# Patient Record
Sex: FEMALE | HISPANIC OR LATINO | Employment: UNEMPLOYED | URBAN - METROPOLITAN AREA
[De-identification: names, ages, dates, MRNs, and addresses within clinical notes are randomized per-mention and may not be internally consistent; named-entity substitution may affect disease eponyms.]

---

## 2017-08-22 ENCOUNTER — TRANSFERRED RECORDS (OUTPATIENT)
Dept: HEALTH INFORMATION MANAGEMENT | Facility: CLINIC | Age: 57
End: 2017-08-22

## 2017-09-21 ENCOUNTER — APPOINTMENT (OUTPATIENT)
Dept: ULTRASOUND IMAGING | Facility: CLINIC | Age: 57
End: 2017-09-21
Attending: EMERGENCY MEDICINE
Payer: COMMERCIAL

## 2017-09-21 ENCOUNTER — HOSPITAL ENCOUNTER (EMERGENCY)
Facility: CLINIC | Age: 57
Discharge: HOME | End: 2017-09-21
Attending: EMERGENCY MEDICINE | Admitting: EMERGENCY MEDICINE
Payer: COMMERCIAL

## 2017-09-21 VITALS
DIASTOLIC BLOOD PRESSURE: 100 MMHG | SYSTOLIC BLOOD PRESSURE: 153 MMHG | TEMPERATURE: 98.4 F | OXYGEN SATURATION: 99 % | RESPIRATION RATE: 18 BRPM

## 2017-09-21 DIAGNOSIS — N89.7 HEMATOCOLPOS: ICD-10-CM

## 2017-09-21 DIAGNOSIS — R31.29 MICROSCOPIC HEMATURIA: ICD-10-CM

## 2017-09-21 DIAGNOSIS — R10.2 PELVIC PAIN IN FEMALE: ICD-10-CM

## 2017-09-21 LAB
ALBUMIN UR-MCNC: 100 MG/DL
AMORPH CRY #/AREA URNS HPF: ABNORMAL /HPF
ANION GAP SERPL CALCULATED.3IONS-SCNC: 5 MMOL/L (ref 3–14)
APPEARANCE UR: ABNORMAL
BACTERIA #/AREA URNS HPF: ABNORMAL /HPF
BASOPHILS # BLD AUTO: 0 10E9/L (ref 0–0.2)
BASOPHILS NFR BLD AUTO: 0.4 %
BILIRUB UR QL STRIP: NEGATIVE
BUN SERPL-MCNC: 17 MG/DL (ref 7–30)
CALCIUM SERPL-MCNC: 9.1 MG/DL (ref 8.5–10.1)
CHLORIDE SERPL-SCNC: 105 MMOL/L (ref 94–109)
CO2 SERPL-SCNC: 28 MMOL/L (ref 20–32)
COLOR UR AUTO: YELLOW
CREAT SERPL-MCNC: 0.66 MG/DL (ref 0.52–1.04)
DIFFERENTIAL METHOD BLD: NORMAL
EOSINOPHIL # BLD AUTO: 0.2 10E9/L (ref 0–0.7)
EOSINOPHIL NFR BLD AUTO: 2.1 %
ERYTHROCYTE [DISTWIDTH] IN BLOOD BY AUTOMATED COUNT: 13 % (ref 10–15)
GFR SERPL CREATININE-BSD FRML MDRD: >90 ML/MIN/1.7M2
GLUCOSE SERPL-MCNC: 93 MG/DL (ref 70–99)
GLUCOSE UR STRIP-MCNC: NEGATIVE MG/DL
HCT VFR BLD AUTO: 38.8 % (ref 35–47)
HGB BLD-MCNC: 12.4 G/DL (ref 11.7–15.7)
HGB UR QL STRIP: ABNORMAL
IMM GRANULOCYTES # BLD: 0.1 10E9/L (ref 0–0.4)
IMM GRANULOCYTES NFR BLD: 0.6 %
KETONES UR STRIP-MCNC: NEGATIVE MG/DL
LEUKOCYTE ESTERASE UR QL STRIP: NEGATIVE
LYMPHOCYTES # BLD AUTO: 2 10E9/L (ref 0.8–5.3)
LYMPHOCYTES NFR BLD AUTO: 23.1 %
MCH RBC QN AUTO: 30.2 PG (ref 26.5–33)
MCHC RBC AUTO-ENTMCNC: 32 G/DL (ref 31.5–36.5)
MCV RBC AUTO: 95 FL (ref 78–100)
MONOCYTES # BLD AUTO: 1 10E9/L (ref 0–1.3)
MONOCYTES NFR BLD AUTO: 11.3 %
MUCOUS THREADS #/AREA URNS LPF: PRESENT /LPF
NEUTROPHILS # BLD AUTO: 5.3 10E9/L (ref 1.6–8.3)
NEUTROPHILS NFR BLD AUTO: 62.5 %
NITRATE UR QL: NEGATIVE
NRBC # BLD AUTO: 0 10*3/UL
NRBC BLD AUTO-RTO: 0 /100
PH UR STRIP: 5 PH (ref 5–7)
PLATELET # BLD AUTO: 291 10E9/L (ref 150–450)
POTASSIUM SERPL-SCNC: 4.1 MMOL/L (ref 3.4–5.3)
RBC # BLD AUTO: 4.1 10E12/L (ref 3.8–5.2)
RBC #/AREA URNS AUTO: 71 /HPF (ref 0–2)
SODIUM SERPL-SCNC: 138 MMOL/L (ref 133–144)
SOURCE: ABNORMAL
SP GR UR STRIP: 1.01 (ref 1–1.03)
SPECIMEN SOURCE: NORMAL
SQUAMOUS #/AREA URNS AUTO: 1 /HPF (ref 0–1)
UROBILINOGEN UR STRIP-MCNC: 0 MG/DL (ref 0–2)
WBC # BLD AUTO: 8.5 10E9/L (ref 4–11)
WBC #/AREA URNS AUTO: 9 /HPF (ref 0–2)
WET PREP SPEC: NORMAL

## 2017-09-21 PROCEDURE — 80048 BASIC METABOLIC PNL TOTAL CA: CPT | Performed by: EMERGENCY MEDICINE

## 2017-09-21 PROCEDURE — 76830 TRANSVAGINAL US NON-OB: CPT

## 2017-09-21 PROCEDURE — 87491 CHLMYD TRACH DNA AMP PROBE: CPT | Performed by: EMERGENCY MEDICINE

## 2017-09-21 PROCEDURE — 87210 SMEAR WET MOUNT SALINE/INK: CPT | Performed by: EMERGENCY MEDICINE

## 2017-09-21 PROCEDURE — 81001 URINALYSIS AUTO W/SCOPE: CPT | Performed by: NURSE PRACTITIONER

## 2017-09-21 PROCEDURE — 85025 COMPLETE CBC W/AUTO DIFF WBC: CPT | Performed by: EMERGENCY MEDICINE

## 2017-09-21 PROCEDURE — 25000128 H RX IP 250 OP 636: Performed by: EMERGENCY MEDICINE

## 2017-09-21 PROCEDURE — 99284 EMERGENCY DEPT VISIT MOD MDM: CPT | Mod: 25

## 2017-09-21 PROCEDURE — 96374 THER/PROPH/DIAG INJ IV PUSH: CPT

## 2017-09-21 PROCEDURE — 87591 N.GONORRHOEAE DNA AMP PROB: CPT | Performed by: EMERGENCY MEDICINE

## 2017-09-21 RX ORDER — KETOROLAC TROMETHAMINE 30 MG/ML
30 INJECTION, SOLUTION INTRAMUSCULAR; INTRAVENOUS ONCE
Status: COMPLETED | OUTPATIENT
Start: 2017-09-21 | End: 2017-09-21

## 2017-09-21 RX ORDER — IBUPROFEN 200 MG
600 TABLET ORAL EVERY 6 HOURS PRN
Qty: 60 TABLET | Refills: 0 | Status: SHIPPED | OUTPATIENT
Start: 2017-09-21 | End: 2019-05-13

## 2017-09-21 RX ADMIN — KETOROLAC TROMETHAMINE 30 MG: 30 INJECTION, SOLUTION INTRAMUSCULAR at 18:03

## 2017-09-21 ASSESSMENT — ENCOUNTER SYMPTOMS
DIARRHEA: 1
HEMATURIA: 1
NAUSEA: 0
VOMITING: 0
DYSURIA: 1
FEVER: 0
BACK PAIN: 0

## 2017-09-21 NOTE — ED AVS SNAPSHOT
[facility] Emergency Department    [address] [facility] [address]    Phone:  [phone]    Fax:  [phone]                                       Lo Stewart   MRN: [id number]    Department:  [facility] Emergency Department   Date of Visit:  9/21/2017           Patient Information     Date Of Birth          1960        Your diagnoses for this visit were:     Microscopic hematuria     Hematocolpos     Pelvic pain in female        You were seen by Ricky Reyna DO.      Follow-up Information     Follow up with Jose Juan Liu MD.    Specialty:  OB/Gyn    Why:  Call the above number and ask to be seen at the [facility] in the next 1-2 weeks.    Contact information:    [facility]  [address]  [facility] [address] [phone]          Follow up with Shobha Malcolm Call in 2 days.    Why:  As needed    Contact information:    [address] [facility]  Attn: [facility]  [facility] [address]          Follow up with [facility] Emergency Department.    Specialty:  EMERGENCY MEDICINE    Why:  If symptoms worsen    Contact information:    [address] [facility] [address]  [phone]        Discharge Instructions         Pelvic Pain, Uncertain Cause    Pelvic pain is pain felt in the lowest part of the belly (abdomen) and between the hipbones. The pain may be acute. This means it occurred suddenly and recently. Or the pain may be chronic. This means it has occurred for 6 months or longer.  There are many possible causes of pelvic pain. The pain may be due to a problem in the female reproductive system (pictured here). Or, it may be due to a problem in the digestive, urinary, or musculoskeletal systems.  Based on your visit today, the exact cause of your pelvic pain is not certain. Your condition does not appear to be serious at this time. But it is important for you to keep watching for any new  symptoms or worsening of your condition.  General care  Your healthcare provider may advise a number of ways to help manage your pain. These can include:    Taking over-the-counter pain medicine. Stronger pain medicine may also be prescribed, if needed.    Applying heat to the pelvic area. Use a heating pad or a hot pack. Taking a hot bath may also help.    Getting plenty of rest.    Making certain lifestyle changes. These can include practicing good posture and getting regular exercise. (Studies have shown that these changes help reduce pelvic pain in some women.)    Seeing a physical therapist or pain specialist. These healthcare providers can discuss other ways to manage pain with you.  Follow-up care  Follow up with your healthcare provider, or as advised.   When to seek medical advice  Call your healthcare provider right away if any of the following occur:    Fever of 100.4 F or higher, or as directed by your healthcare provider    Pain worsens or you have sudden, severe pain or new pain    Nausea, vomiting, sweating, or restlessness    Dizziness or fainting    Unusual vaginal discharge    Abnormal vaginal bleeding (especially bleeding after menopause)  Date Last Reviewed: 6/11/2015 2000-2017 [employer]. [address] [place] [address]. All rights reserved. This information is not intended as a substitute for professional medical care. Always follow your healthcare professional's instructions.        Blood in the Urine    Blood in the urine (hematuria) has many possible causes. If it occurs after an injury (such as a car accident or fall), it is most often a sign of bruising to the kidney or bladder. Common causes of blood in the urine include urinary tract infections, kidney stones, inflammation, tumors, or certain other diseases of the kidney or bladder. Menstruation can cause blood to appear in the urine sample, although it is not coming from the urinary tract.  If only a trace  amount of blood is present, it will show up on the urine test, even though the urine may be yellow and not pink or red. This may occur with any of the above conditions, as well as heavy exercise or high fever. In this case, your doctor may want to repeat the urine test on another day. This will show if the blood is still present. If it is, then other tests can be done to find out the cause.  Home care  Follow these home care guidelines:    If your urine does not appear bloody (pink, brown or red) then you do not need to restrict your activity in any way.    If you can see blood in your urine, rest and avoid heavy exertion until your next exam. Do not use aspirin, blood thinners, or anti-platelet or anti-inflammatory medicines. These include ibuprofen and naproxen. These thin the blood and may increase bleeding.  Follow-up care  Follow up with your healthcare provider, or as advised. If you were injured and had blood in your urine, you should have a repeat urine test in 1 to 2 days. Contact your doctor for this test.  A radiologist will review any X-rays that were taken. You will be told of any new findings that may affect your care.  When to seek medical advice  Call your healthcare provider right away if any of these occur:    Bright red blood or blood clots in the urine (if you did not have this before)    Weakness, dizziness or fainting    New groin, abdominal, or back pain    Fever of 100.4 F (38 C) or higher, or as directed by your healthcare provider    Repeated vomiting    Bleeding from the nose or gums or easy bruising  Date Last Reviewed: 9/1/2016 2000-2017 [employer] [address] [place] [address], [place] [address]. All rights reserved. This information is not intended as a substitute for professional medical care. Always follow your healthcare professional's instructions.          24 Hour Appointment Hotline       To make an appointment at any [facility], call [phone]  [phone]). If you don't have a family doctor or clinic, we will help you find one. [place] clinics are conveniently located to serve the needs of you and your family.             Review of your medicines      START taking        Dose / Directions Last dose taken    ibuprofen 200 MG tablet   Commonly known as:  ADVIL/MOTRIN   Dose:  600 mg   Quantity:  60 tablet        Take 3 tablets (600 mg) by mouth every 6 hours as needed for mild pain or fever   Refills:  0                Prescriptions were sent or printed at these locations (1 Prescription)                   Other Prescriptions                Printed at Department/Unit printer (1 of 1)         ibuprofen (ADVIL/MOTRIN) 200 MG tablet                Procedures and tests performed during your visit     Basic metabolic panel    CBC with platelets differential    Chlamydia trachomatis PCR    Neisseria gonorrhoea PCR    Prep for procedure - pelvic exam    UA with Microscopic    US Pelvic Complete w Transvaginal    Wet prep      Orders Needing Specimen Collection     None      Pending Results     Date and Time Order Name Status Description    9/21/2017 1742 Neisseria gonorrhoea PCR In process     9/21/2017 1742 Chlamydia trachomatis PCR In process     9/21/2017 1742 US Pelvic Complete w Transvaginal Preliminary             Pending Culture Results     Date and Time Order Name Status Description    9/21/2017 1742 Neisseria gonorrhoea PCR In process     9/21/2017 1742 Chlamydia trachomatis PCR In process             Pending Results Instructions     If you had any lab results that were not finalized at the time of your Discharge, you can call the ED Lab Result RN at [phone]. You will be contacted by this team for any positive Lab results or changes in treatment. The nurses are available 7 days a week from 10A to 6:30P.  You can leave a message 24 hours per day and they will return your call.        Test Results From Your Hospital Stay        9/21/2017  4:39 PM       Component Results     Component Value Ref Range & Units Status    Color Urine Yellow  Final    Appearance Urine Cloudy  Final    Glucose Urine Negative NEG^Negative mg/dL Final    Bilirubin Urine Negative NEG^Negative Final    Ketones Urine Negative NEG^Negative mg/dL Final    Specific Gravity Urine 1.014 1.003 - 1.035 Final    Blood Urine Large (A) NEG^Negative Final    pH Urine 5.0 5.0 - 7.0 pH Final    Protein Albumin Urine 100 (A) NEG^Negative mg/dL Final    Urobilinogen mg/dL 0.0 0.0 - 2.0 mg/dL Final    Nitrite Urine Negative NEG^Negative Final    Leukocyte Esterase Urine Negative NEG^Negative Final    Source Midstream Urine  Final    WBC Urine 9 (H) 0 - 2 /HPF Final    RBC Urine 71 (H) 0 - 2 /HPF Final    Bacteria Urine Few (A) NEG^Negative /HPF Final    Squamous Epithelial /HPF Urine 1 0 - 1 /HPF Final    Mucous Urine Present (A) NEG^Negative /LPF Final    Amorphous Crystals Few (A) NEG^Negative /HPF Final         9/21/2017  6:25 PM      Component Results     Component Value Ref Range & Units Status    WBC 8.5 4.0 - 11.0 10e9/L Final    RBC Count 4.10 3.8 - 5.2 10e12/L Final    Hemoglobin 12.4 11.7 - 15.7 g/dL Final    Hematocrit 38.8 35.0 - 47.0 % Final    MCV 95 78 - 100 fl Final    MCH 30.2 26.5 - 33.0 pg Final    MCHC 32.0 31.5 - 36.5 g/dL Final    RDW 13.0 10.0 - 15.0 % Final    Platelet Count 291 150 - 450 10e9/L Final    Diff Method Automated Method  Final    % Neutrophils 62.5 % Final    % Lymphocytes 23.1 % Final    % Monocytes 11.3 % Final    % Eosinophils 2.1 % Final    % Basophils 0.4 % Final    % Immature Granulocytes 0.6 % Final    Nucleated RBCs 0 0 /100 Final    Absolute Neutrophil 5.3 1.6 - 8.3 10e9/L Final    Absolute Lymphocytes 2.0 0.8 - 5.3 10e9/L Final    Absolute Monocytes 1.0 0.0 - 1.3 10e9/L Final    Absolute Eosinophils 0.2 0.0 - 0.7 10e9/L Final    Absolute Basophils 0.0 0.0 - 0.2 10e9/L Final    Abs Immature Granulocytes 0.1 0 - 0.4 10e9/L Final    Absolute Nucleated RBC 0.0   Final         9/21/2017  6:48 PM      Component Results     Component Value Ref Range & Units Status    Sodium 138 133 - 144 mmol/L Final    Potassium 4.1 3.4 - 5.3 mmol/L Final    Chloride 105 94 - 109 mmol/L Final    Carbon Dioxide 28 20 - 32 mmol/L Final    Anion Gap 5 3 - 14 mmol/L Final    Glucose 93 70 - 99 mg/dL Final    Urea Nitrogen 17 7 - 30 mg/dL Final    Creatinine 0.66 0.52 - 1.04 mg/dL Final    GFR Estimate >90 >60 mL/min/1.7m2 Final    Non  GFR Calc    GFR Estimate If Black >90 >60 mL/min/1.7m2 Final    African American GFR Calc    Calcium 9.1 8.5 - 10.1 mg/dL Final         9/21/2017  7:53 PM      Narrative     PELVIC ULTRASOUND  WITH ENDOVAGINAL TRANSDUCER  9/21/2017 7:40 PM     HISTORY: Pelvic pain    TECHNIQUE: Endovaginal sonography was added to the transabdominal exam  to better evaluate the uterus and ovaries.      COMPARISON: CT abdomen and pelvis dated 12/26/2016.    FINDINGS: The uterus is normal in size, shape and echotexture  measuring 6.2 x 2.9 x 4.1 cm. Endometrium contains a mildly complex  fluid measuring up to 1.1 cm in thickness. This likely represents a  small amount of blood and could be related to menstrual cycle.  Recommend clinical correlation.    The ovaries are not seen and cannot be evaluated. No abnormal free  fluid collections are identified.        Impression     IMPRESSION:  1. Mildly complex fluid (measuring up to 1.1 cm in thickness) is seen  in the endometrial canal of uncertain etiology, but could represent an  hematometrocolpos or hydrometrocolpos. I cannot exclude an anatomic  mechanical obstruction of the distal endometrial canal. Recommend  clinical correlation.  2. Ovaries are not seen and cannot be evaluated. No obvious adnexal  mass is identified.  3. No other significant abnormalities are identified.         9/21/2017  6:48 PM      Component Results     Component    Specimen Description    Vagina    Wet Prep    Few  PMNs seen      Wet Prep    No  clue cells seen    Wet Prep    No yeast seen    Wet Prep    No Trichomonas seen         9/21/2017  6:28 PM         9/21/2017  6:28 PM                Clinical Quality Measure: Blood Pressure Screening     Your blood pressure was checked while you were in the emergency department today. The last reading we obtained was  BP: (!) 153/100 . Please read the guidelines below about what these numbers mean and what you should do about them.  If your systolic blood pressure (the top number) is less than 120 and your diastolic blood pressure (the bottom number) is less than 80, then your blood pressure is normal. There is nothing more that you need to do about it.  If your systolic blood pressure (the top number) is 120-139 or your diastolic blood pressure (the bottom number) is 80-89, your blood pressure may be higher than it should be. You should have your blood pressure rechecked within a year by a primary care provider.  If your systolic blood pressure (the top number) is 140 or greater or your diastolic blood pressure (the bottom number) is 90 or greater, you may have high blood pressure. High blood pressure is treatable, but if left untreated over time it can put you at risk for heart attack, stroke, or kidney failure. You should have your blood pressure rechecked by a primary care provider within the next 4 weeks.  If your provider in the emergency department today gave you specific instructions to follow-up with your doctor or provider even sooner than that, you should follow that instruction and not wait for up to 4 weeks for your follow-up visit.        Thank you for choosing [facility]       Thank you for choosing [facility] for your care. Our goal is always to provide you with excellent care. Hearing back from our patients is one way we can continue to improve our services. Please take a few minutes to complete the written survey that you may receive in the mail after you visit with us. Thank you!        Chelsey  "Information     [employer] lets you send messages to your doctor, view your test results, renew your prescriptions, schedule appointments and more. To sign up, go to [email or web address] . Click on \"Log in\" on the left side of the screen, which will take you to the Welcome page. Then click on \"Sign up Now\" on the right side of the page.     You will be asked to enter the access code listed below, as well as some personal information. Please follow the directions to create your username and password.     Your access code is: [email or web address]  Expires: 2017  8:22 PM     Your access code will  in 90 days. If you need help or a new code, please call your [facility] clinic or [phone].        Care EveryWhere ID     This is your Care EveryWhere ID. This could be used by other organizations to access your [facility] medical records  [id number]        Equal Access to Services     COURTNEY POMPA AH: Hadluz elena nguyễno Socary, waaxda lurosemarieadaha, qaybta kaalmada adenubia, alvina quiles . So [facility] [phone].    ATENCIÓN: Si habla español, tiene a montiel disposición servicios gratuitos de asistencia lingüística. Llame al [phone].    We comply with applicable federal civil rights laws and [place] laws. We do not discriminate on the basis of race, color, national origin, age, disability sex, sexual orientation or gender identity.            After Visit Summary       This is your record. Keep this with you and show to your community pharmacist(s) and doctor(s) at your next visit.                  "

## 2017-09-21 NOTE — ED PROVIDER NOTES
History     Chief Complaint:  Dysuria, Hematuria    The history is provided by the patient.      Lo Stewart is a 57 year old female who presents with diarrhea, dysuria, and hematuria. The patient is Divehi-speaking and provided the history using a  over the phone. The patient states she has had diarrhea for a few days, and then this morning she had a lot of blood in her urine and felt a burning sensation when she used the restroom. Those symptoms have stayed consistent throughout the day, prompting her to come to the ED for evaluation this evening. The patient states she only experiences pain when she uses the bathroom, but denies any fever, back pain, or other abdominal pain.    Allergies:  No known drug allergies    Medications:    The patient is not currently taking any prescribed medications.    Past Medical History:    Arthritis  Hypertension  Major depressive disorder    Past Surgical History:    Ovarian cyst removal  Cyst removal, leg  Lumpectomy breast  Rotator cuff repair, right shoulder  Orthopedic surgery, right knee    Family History:    Diabetes  Hypertension  Depression  Anxiety  Osteoporosis    Social History:  Smoking status: No  Alcohol use: Yes, rarely  Marital Status:  Single [1]    Review of Systems   Constitutional: Negative for fever.   Gastrointestinal: Positive for diarrhea. Negative for nausea and vomiting.   Genitourinary: Positive for dysuria and hematuria.   Musculoskeletal: Negative for back pain.   All other systems reviewed and are negative.      Physical Exam     Patient Vitals for the past 24 hrs:   BP Temp Heart Rate Resp SpO2   09/21/17 1810 (!) 153/100 - - - 99 %   09/21/17 1800 - - - - 99 %   09/21/17 1756 - - - - 100 %   09/21/17 1755 123/78 - - - 99 %   09/21/17 1745 - - - - 98 %   09/21/17 1741 - - - - 99 %   09/21/17 1740 139/89 - - - 99 %   09/21/17 1737 - - - - 99 %   09/21/17 1526 (!) 142/101 98.4  F (36.9  C) 81 18 98 %     Physical  Exam  Constitutional: Patient appears well-developed and well-nourished. Patient is in mild distress  HENT:    Head: No external signs of trauma noted.   Eyes: Conjunctivae are normal. Pupils are equal, round, and reactive to light.   Cardiovascular:    Normal rate, regular rhythm and normal heart sounds.     Exam reveals no friction rub.     No murmur heard.  Pulmonary/Chest:    Effort normal and breath sounds normal.    No respiratory distress.    There are no wheezes.    There are no rales.   Abdominal:    Soft.    Bowel sounds normal.    There is no distension.    There is no tenderness.    There is no rebound or guarding.    (Chaperoned):    Vulva: No external lesions, normal hair distribution, no adenopathy   Vagina: Moist, pink, no abnormal discharge, well rugated, no lesions   Cervix: smooth, pink, no visible lesions, no CMT   Uterus: Normal size, non-tender, mobile   Ovaries: No mass, non-tender, mobile   Cultures for GC, Chlamydia, and Trichomonas were taken  Musculoskeletal:    Normal range of motion.    Normal Tone  Neurological: Patient is alert and oriented to person, place, and time.   Skin: Skin is warm and dry. Patient is not diaphoretic.    Emergency Department Course   Imaging:  Radiographic findings were communicated with the patient who voiced understanding of the findings.    US Pelvic Complete w Transvaginal:  IMPRESSION:  1. Mildly complex fluid (measuring up to 1.1 cm in thickness) is seen  in the endometrial canal of uncertain etiology, but could represent an  hematometrocolpos or hydrometrocolpos. I cannot exclude an anatomic  mechanical obstruction of the distal endometrial canal. Recommend  clinical correlation.  2. Ovaries are not seen and cannot be evaluated. No obvious adnexal  mass is identified.  3. No other significant abnormalities are identified.  As read by Radiology.    Laboratory:  UA: Blood large, Protein albumin 100, WBC 9 (H), RBC 71 (H), Bacteria few, Mucous present,  "Amorphous crystals few, o/w negative  CBC: WNL (WBC 8.5, HGB 12.4, )   BMP: WNL (Creatinine 0.66)  Chlamydia trachomatis: Pending  Neisseria gonorrhoea: Pending  Wet prep: Normal    Interventions:  1803: 30 mg Toradol     Emergency Department Course:  Past medical records, nursing notes, and vitals reviewed.  1706: I performed an exam of the patient and obtained history, as documented above.  UA collected, results above.    1800: I rechecked the patient. Explained findings to the patient.    2015: D/W Dr. Liu [facility]. Discussed patient's presentation, exam, and US findings. Patient can follow up in office in 1-2 weeks.    Impression & Plan      Medical Decision Making:  Lo Stewart is a 57 year old female in Room 30. The patient presents to the ER for evaluation of hematuria and \"vaginal pain.\" Using the , it seemed like she was having some pelvic-area pain, but no vaginal bleeding. The patient had no abdominal or back pain at all. Her urinalysis was negative for infection, and the patient has not been febrile, making a UTI much less likely. The patient did have some discomfort on the bilateral adnexal area, but no cervical motion tenderness. There was no vaginal discharge noted either. The patient's ultrasound shows findings possibly c/w hematometrocolpos or hydrometrocolpos. I discussed this with OB, who would like to see the patient in the clinic in 1-2 weeks. At this time, I believe the patient is stable for discharge and can follow up in the outpatient setting. Anticipatory guidance given prior to discharge.     Diagnosis:    ICD-10-CM    1. Microscopic hematuria R31.29    2. Hematocolpos N94.89    3. Pelvic pain in female R10.2        Disposition: Discharged to home    Discharge Medications:  New Prescriptions    IBUPROFEN (ADVIL/MOTRIN) 200 MG TABLET    Take 3 tablets (600 mg) by mouth every 6 hours as needed for mild pain or fever     Susan Coe  9/21/2017 "   [facility] EMERGENCY DEPARTMENT    I, Susan Coe, am serving as a scribe at 5:06 PM on 9/21/2017 to document services personally performed by Ricky Reyna DO based on my observations and the provider's statements to me.        Ricky Reyna DO  09/21/17 2024

## 2017-09-21 NOTE — ED AVS SNAPSHOT
[facility] Emergency Department    [address]    [facility] [address]    Phone:  [phone]    Fax:  [phone]                                       Lo Stewart   MRN: [id number]    Department:  [facility] Emergency Department   Date of Visit:  9/21/2017           After Visit Summary Signature Page     I have received my discharge instructions, and my questions have been answered. I have discussed any challenges I see with this plan with the nurse or doctor.    ..........................................................................................................................................  Patient/Patient Representative Signature      ..........................................................................................................................................  Patient Representative Print Name and Relationship to Patient    ..................................................               ................................................  Date                                            Time    ..........................................................................................................................................  Reviewed by Signature/Title    ...................................................              ..............................................  Date                                                            Time

## 2017-09-22 LAB
C TRACH DNA SPEC QL NAA+PROBE: NEGATIVE
N GONORRHOEA DNA SPEC QL NAA+PROBE: NEGATIVE
SPECIMEN SOURCE: NORMAL
SPECIMEN SOURCE: NORMAL

## 2017-09-22 NOTE — DISCHARGE INSTRUCTIONS
Pelvic Pain, Uncertain Cause    Pelvic pain is pain felt in the lowest part of the belly (abdomen) and between the hipbones. The pain may be acute. This means it occurred suddenly and recently. Or the pain may be chronic. This means it has occurred for 6 months or longer.  There are many possible causes of pelvic pain. The pain may be due to a problem in the female reproductive system (pictured here). Or, it may be due to a problem in the digestive, urinary, or musculoskeletal systems.  Based on your visit today, the exact cause of your pelvic pain is not certain. Your condition does not appear to be serious at this time. But it is important for you to keep watching for any new symptoms or worsening of your condition.  General care  Your healthcare provider may advise a number of ways to help manage your pain. These can include:    Taking over-the-counter pain medicine. Stronger pain medicine may also be prescribed, if needed.    Applying heat to the pelvic area. Use a heating pad or a hot pack. Taking a hot bath may also help.    Getting plenty of rest.    Making certain lifestyle changes. These can include practicing good posture and getting regular exercise. (Studies have shown that these changes help reduce pelvic pain in some women.)    Seeing a physical therapist or pain specialist. These healthcare providers can discuss other ways to manage pain with you.  Follow-up care  Follow up with your healthcare provider, or as advised.   When to seek medical advice  Call your healthcare provider right away if any of the following occur:    Fever of 100.4 F or higher, or as directed by your healthcare provider    Pain worsens or you have sudden, severe pain or new pain    Nausea, vomiting, sweating, or restlessness    Dizziness or fainting    Unusual vaginal discharge    Abnormal vaginal bleeding (especially bleeding after menopause)  Date Last Reviewed: 6/11/2015 2000-2017 [employer]. [address] [place] [address]  [place] [address]. All rights reserved. This information is not intended as a substitute for professional medical care. Always follow your healthcare professional's instructions.        Blood in the Urine    Blood in the urine (hematuria) has many possible causes. If it occurs after an injury (such as a car accident or fall), it is most often a sign of bruising to the kidney or bladder. Common causes of blood in the urine include urinary tract infections, kidney stones, inflammation, tumors, or certain other diseases of the kidney or bladder. Menstruation can cause blood to appear in the urine sample, although it is not coming from the urinary tract.  If only a trace amount of blood is present, it will show up on the urine test, even though the urine may be yellow and not pink or red. This may occur with any of the above conditions, as well as heavy exercise or high fever. In this case, your doctor may want to repeat the urine test on another day. This will show if the blood is still present. If it is, then other tests can be done to find out the cause.  Home care  Follow these home care guidelines:    If your urine does not appear bloody (pink, brown or red) then you do not need to restrict your activity in any way.    If you can see blood in your urine, rest and avoid heavy exertion until your next exam. Do not use aspirin, blood thinners, or anti-platelet or anti-inflammatory medicines. These include ibuprofen and naproxen. These thin the blood and may increase bleeding.  Follow-up care  Follow up with your healthcare provider, or as advised. If you were injured and had blood in your urine, you should have a repeat urine test in 1 to 2 days. Contact your doctor for this test.  A radiologist will review any X-rays that were taken. You will be told of any new findings that may affect your care.  When to seek medical advice  Call your healthcare provider right away if any of these occur:    Bright red  blood or blood clots in the urine (if you did not have this before)    Weakness, dizziness or fainting    New groin, abdominal, or back pain    Fever of 100.4 F (38 C) or higher, or as directed by your healthcare provider    Repeated vomiting    Bleeding from the nose or gums or easy bruising  Date Last Reviewed: 9/1/2016 2000-2017 [employer]. [address] [place] [address]. All rights reserved. This information is not intended as a substitute for professional medical care. Always follow your healthcare professional's instructions.

## 2018-05-15 ENCOUNTER — RECORDS - HEALTHEAST (OUTPATIENT)
Dept: ADMINISTRATIVE | Facility: OTHER | Age: 58
End: 2018-05-15

## 2018-05-16 ENCOUNTER — AMBULATORY - HEALTHEAST (OUTPATIENT)
Dept: VASCULAR SURGERY | Facility: CLINIC | Age: 58
End: 2018-05-16

## 2018-05-16 DIAGNOSIS — I83.93 VARICOSE VEINS OF BOTH LOWER EXTREMITIES: ICD-10-CM

## 2018-05-30 ENCOUNTER — OFFICE VISIT - HEALTHEAST (OUTPATIENT)
Dept: VASCULAR SURGERY | Facility: CLINIC | Age: 58
End: 2018-05-30

## 2018-05-30 ENCOUNTER — RECORDS - HEALTHEAST (OUTPATIENT)
Dept: VASCULAR ULTRASOUND | Facility: CLINIC | Age: 58
End: 2018-05-30

## 2018-05-30 DIAGNOSIS — M79.89 OTHER SPECIFIED SOFT TISSUE DISORDERS: ICD-10-CM

## 2018-05-30 DIAGNOSIS — I83.893 VARICOSE VEINS OF BILATERAL LOWER EXTREMITIES WITH OTHER COMPLICATIONS: ICD-10-CM

## 2018-05-30 DIAGNOSIS — M79.89 LEG SWELLING: ICD-10-CM

## 2018-05-30 DIAGNOSIS — I83.893 SYMPTOMATIC VARICOSE VEINS OF BOTH LOWER EXTREMITIES: ICD-10-CM

## 2018-05-30 ASSESSMENT — MIFFLIN-ST. JEOR: SCORE: 1487.15

## 2019-05-13 ENCOUNTER — APPOINTMENT (OUTPATIENT)
Dept: CT IMAGING | Facility: CLINIC | Age: 59
End: 2019-05-13
Attending: EMERGENCY MEDICINE
Payer: COMMERCIAL

## 2019-05-13 ENCOUNTER — APPOINTMENT (OUTPATIENT)
Dept: GENERAL RADIOLOGY | Facility: CLINIC | Age: 59
End: 2019-05-13
Attending: EMERGENCY MEDICINE
Payer: COMMERCIAL

## 2019-05-13 ENCOUNTER — APPOINTMENT (OUTPATIENT)
Dept: MRI IMAGING | Facility: CLINIC | Age: 59
End: 2019-05-13
Attending: EMERGENCY MEDICINE
Payer: COMMERCIAL

## 2019-05-13 ENCOUNTER — HOSPITAL ENCOUNTER (OUTPATIENT)
Facility: CLINIC | Age: 59
Setting detail: OBSERVATION
Discharge: HOME | End: 2019-05-16
Attending: EMERGENCY MEDICINE | Admitting: HOSPITALIST
Payer: COMMERCIAL

## 2019-05-13 DIAGNOSIS — S06.0X0A CONCUSSION WITHOUT LOSS OF CONSCIOUSNESS, INITIAL ENCOUNTER: Primary | ICD-10-CM

## 2019-05-13 DIAGNOSIS — M25.562 ACUTE PAIN OF LEFT KNEE: ICD-10-CM
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
## 2019-05-13 DIAGNOSIS — M79.662 PAIN OF LEFT LOWER LEG: ICD-10-CM

## 2019-05-13 LAB
ANION GAP SERPL CALCULATED.3IONS-SCNC: 3 MMOL/L (ref 3–14)
BASOPHILS # BLD AUTO: 0 10E9/L (ref 0–0.2)
BASOPHILS NFR BLD AUTO: 0.4 %
BUN SERPL-MCNC: 17 MG/DL (ref 7–30)
CALCIUM SERPL-MCNC: 8.4 MG/DL (ref 8.5–10.1)
CHLORIDE SERPL-SCNC: 110 MMOL/L (ref 94–109)
CO2 SERPL-SCNC: 29 MMOL/L (ref 20–32)
CREAT SERPL-MCNC: 0.58 MG/DL (ref 0.52–1.04)
DIFFERENTIAL METHOD BLD: NORMAL
EOSINOPHIL # BLD AUTO: 0.2 10E9/L (ref 0–0.7)
EOSINOPHIL NFR BLD AUTO: 2.3 %
ERYTHROCYTE [DISTWIDTH] IN BLOOD BY AUTOMATED COUNT: 13.1 % (ref 10–15)
GFR SERPL CREATININE-BSD FRML MDRD: >90 ML/MIN/{1.73_M2}
GLUCOSE BLDC GLUCOMTR-MCNC: 91 MG/DL (ref 70–99)
GLUCOSE SERPL-MCNC: 97 MG/DL (ref 70–99)
HCT VFR BLD AUTO: 38.8 % (ref 35–47)
HGB BLD-MCNC: 12.4 G/DL (ref 11.7–15.7)
IMM GRANULOCYTES # BLD: 0 10E9/L (ref 0–0.4)
IMM GRANULOCYTES NFR BLD: 0.4 %
LYMPHOCYTES # BLD AUTO: 2.4 10E9/L (ref 0.8–5.3)
LYMPHOCYTES NFR BLD AUTO: 28.9 %
MCH RBC QN AUTO: 30.2 PG (ref 26.5–33)
MCHC RBC AUTO-ENTMCNC: 32 G/DL (ref 31.5–36.5)
MCV RBC AUTO: 94 FL (ref 78–100)
MONOCYTES # BLD AUTO: 0.8 10E9/L (ref 0–1.3)
MONOCYTES NFR BLD AUTO: 9.4 %
NEUTROPHILS # BLD AUTO: 4.9 10E9/L (ref 1.6–8.3)
NEUTROPHILS NFR BLD AUTO: 58.6 %
NRBC # BLD AUTO: 0 10*3/UL
NRBC BLD AUTO-RTO: 0 /100
PLATELET # BLD AUTO: 264 10E9/L (ref 150–450)
POTASSIUM SERPL-SCNC: 4 MMOL/L (ref 3.4–5.3)
RBC # BLD AUTO: 4.11 10E12/L (ref 3.8–5.2)
SODIUM SERPL-SCNC: 142 MMOL/L (ref 133–144)
WBC # BLD AUTO: 8.3 10E9/L (ref 4–11)

## 2019-05-13 PROCEDURE — 36415 COLL VENOUS BLD VENIPUNCTURE: CPT | Performed by: EMERGENCY MEDICINE

## 2019-05-13 PROCEDURE — 85025 COMPLETE CBC W/AUTO DIFF WBC: CPT | Performed by: EMERGENCY MEDICINE

## 2019-05-13 PROCEDURE — 80048 BASIC METABOLIC PNL TOTAL CA: CPT | Performed by: EMERGENCY MEDICINE

## 2019-05-13 PROCEDURE — G0378 HOSPITAL OBSERVATION PER HR: HCPCS

## 2019-05-13 PROCEDURE — 73562 X-RAY EXAM OF KNEE 3: CPT | Mod: LT

## 2019-05-13 PROCEDURE — 72195 MRI PELVIS W/O DYE: CPT

## 2019-05-13 PROCEDURE — 99219 ZZC INITIAL OBSERVATION CARE,LEVL II: CPT | Performed by: PHYSICIAN ASSISTANT

## 2019-05-13 PROCEDURE — 25000128 H RX IP 250 OP 636: Performed by: EMERGENCY MEDICINE

## 2019-05-13 PROCEDURE — 73552 X-RAY EXAM OF FEMUR 2/>: CPT | Mod: LT

## 2019-05-13 PROCEDURE — 25800030 ZZH RX IP 258 OP 636: Performed by: EMERGENCY MEDICINE

## 2019-05-13 PROCEDURE — 96361 HYDRATE IV INFUSION ADD-ON: CPT

## 2019-05-13 PROCEDURE — 72125 CT NECK SPINE W/O DYE: CPT

## 2019-05-13 PROCEDURE — 72170 X-RAY EXAM OF PELVIS: CPT

## 2019-05-13 PROCEDURE — 00000146 ZZHCL STATISTIC GLUCOSE BY METER IP

## 2019-05-13 PROCEDURE — 96374 THER/PROPH/DIAG INJ IV PUSH: CPT

## 2019-05-13 PROCEDURE — 99285 EMERGENCY DEPT VISIT HI MDM: CPT | Mod: 25

## 2019-05-13 PROCEDURE — 25000132 ZZH RX MED GY IP 250 OP 250 PS 637: Performed by: PHYSICIAN ASSISTANT

## 2019-05-13 PROCEDURE — 70450 CT HEAD/BRAIN W/O DYE: CPT

## 2019-05-13 RX ORDER — LIDOCAINE 40 MG/G
CREAM TOPICAL
Status: DISCONTINUED | OUTPATIENT
Start: 2019-05-13 | End: 2019-05-16 | Stop reason: HOSPADM

## 2019-05-13 RX ORDER — HYDROXYZINE HYDROCHLORIDE 50 MG/1
50 TABLET, FILM COATED ORAL EVERY 6 HOURS PRN
Status: DISCONTINUED | OUTPATIENT
Start: 2019-05-13 | End: 2019-05-16 | Stop reason: HOSPADM

## 2019-05-13 RX ORDER — NALOXONE HYDROCHLORIDE 0.4 MG/ML
.1-.4 INJECTION, SOLUTION INTRAMUSCULAR; INTRAVENOUS; SUBCUTANEOUS
Status: DISCONTINUED | OUTPATIENT
Start: 2019-05-13 | End: 2019-05-16 | Stop reason: HOSPADM

## 2019-05-13 RX ORDER — LISINOPRIL AND HYDROCHLOROTHIAZIDE 20; 25 MG/1; MG/1
1 TABLET ORAL DAILY
Status: DISCONTINUED | OUTPATIENT
Start: 2019-05-13 | End: 2019-05-14

## 2019-05-13 RX ORDER — ONDANSETRON 2 MG/ML
4 INJECTION INTRAMUSCULAR; INTRAVENOUS EVERY 6 HOURS PRN
Status: DISCONTINUED | OUTPATIENT
Start: 2019-05-13 | End: 2019-05-16 | Stop reason: HOSPADM

## 2019-05-13 RX ORDER — LISINOPRIL AND HYDROCHLOROTHIAZIDE 20; 25 MG/1; MG/1
1 TABLET ORAL DAILY
Status: ON HOLD | COMMUNITY
End: 2019-05-16

## 2019-05-13 RX ORDER — MORPHINE SULFATE 4 MG/ML
4 INJECTION, SOLUTION INTRAMUSCULAR; INTRAVENOUS ONCE
Status: COMPLETED | OUTPATIENT
Start: 2019-05-13 | End: 2019-05-13

## 2019-05-13 RX ORDER — ACETAMINOPHEN 500 MG
1000 TABLET ORAL
Status: DISCONTINUED | OUTPATIENT
Start: 2019-05-13 | End: 2019-05-14

## 2019-05-13 RX ORDER — POLYETHYLENE GLYCOL 3350 17 G/17G
17 POWDER, FOR SOLUTION ORAL DAILY PRN
Status: DISCONTINUED | OUTPATIENT
Start: 2019-05-13 | End: 2019-05-16 | Stop reason: HOSPADM

## 2019-05-13 RX ORDER — ONDANSETRON 4 MG/1
4 TABLET, ORALLY DISINTEGRATING ORAL EVERY 6 HOURS PRN
Status: DISCONTINUED | OUTPATIENT
Start: 2019-05-13 | End: 2019-05-16 | Stop reason: HOSPADM

## 2019-05-13 RX ORDER — AMLODIPINE BESYLATE 10 MG/1
10 TABLET ORAL DAILY
Status: DISCONTINUED | OUTPATIENT
Start: 2019-05-13 | End: 2019-05-16 | Stop reason: HOSPADM

## 2019-05-13 RX ORDER — CITALOPRAM HYDROBROMIDE 40 MG/1
40 TABLET ORAL DAILY
COMMUNITY

## 2019-05-13 RX ORDER — IBUPROFEN 200 MG
200 TABLET ORAL EVERY 6 HOURS PRN
Status: ON HOLD | COMMUNITY
End: 2019-05-16

## 2019-05-13 RX ORDER — AMLODIPINE BESYLATE 10 MG/1
10 TABLET ORAL DAILY
Status: ON HOLD | COMMUNITY
End: 2024-03-19

## 2019-05-13 RX ORDER — CITALOPRAM HYDROBROMIDE 20 MG/1
40 TABLET ORAL DAILY
Status: DISCONTINUED | OUTPATIENT
Start: 2019-05-14 | End: 2019-05-16 | Stop reason: HOSPADM

## 2019-05-13 RX ORDER — SODIUM CHLORIDE 9 MG/ML
INJECTION, SOLUTION INTRAVENOUS CONTINUOUS
Status: DISCONTINUED | OUTPATIENT
Start: 2019-05-13 | End: 2019-05-13

## 2019-05-13 RX ORDER — ATORVASTATIN CALCIUM 40 MG/1
40 TABLET, FILM COATED ORAL DAILY
COMMUNITY

## 2019-05-13 RX ORDER — ACETAMINOPHEN 325 MG/1
650 TABLET ORAL EVERY 4 HOURS PRN
Status: DISCONTINUED | OUTPATIENT
Start: 2019-05-13 | End: 2019-05-13

## 2019-05-13 RX ORDER — IBUPROFEN 600 MG/1
600 TABLET, FILM COATED ORAL EVERY 6 HOURS PRN
Status: DISCONTINUED | OUTPATIENT
Start: 2019-05-13 | End: 2019-05-14

## 2019-05-13 RX ORDER — OXYCODONE HYDROCHLORIDE 5 MG/1
5-10 TABLET ORAL
Status: DISCONTINUED | OUTPATIENT
Start: 2019-05-13 | End: 2019-05-16 | Stop reason: HOSPADM

## 2019-05-13 RX ORDER — ATORVASTATIN CALCIUM 40 MG/1
40 TABLET, FILM COATED ORAL EVERY EVENING
Status: DISCONTINUED | OUTPATIENT
Start: 2019-05-13 | End: 2019-05-16 | Stop reason: HOSPADM

## 2019-05-13 RX ORDER — HYDROXYZINE HYDROCHLORIDE 25 MG/1
25 TABLET, FILM COATED ORAL EVERY 6 HOURS PRN
Status: DISCONTINUED | OUTPATIENT
Start: 2019-05-13 | End: 2019-05-16 | Stop reason: HOSPADM

## 2019-05-13 RX ADMIN — LISINOPRIL AND HYDROCHLOROTHIAZIDE 1 TABLET: 25; 20 TABLET ORAL at 16:17

## 2019-05-13 RX ADMIN — OXYCODONE HYDROCHLORIDE 5 MG: 5 TABLET ORAL at 16:08

## 2019-05-13 RX ADMIN — AMLODIPINE BESYLATE 10 MG: 10 TABLET ORAL at 16:08

## 2019-05-13 RX ADMIN — OXYCODONE HYDROCHLORIDE 5 MG: 5 TABLET ORAL at 22:49

## 2019-05-13 RX ADMIN — ATORVASTATIN CALCIUM 40 MG: 40 TABLET, FILM COATED ORAL at 19:42

## 2019-05-13 RX ADMIN — SODIUM CHLORIDE: 9 INJECTION, SOLUTION INTRAVENOUS at 09:32

## 2019-05-13 RX ADMIN — ACETAMINOPHEN 1000 MG: 500 TABLET, FILM COATED ORAL at 17:28

## 2019-05-13 RX ADMIN — MORPHINE SULFATE 4 MG: 4 INJECTION INTRAVENOUS at 11:56

## 2019-05-13 RX ADMIN — OXYCODONE HYDROCHLORIDE 5 MG: 5 TABLET ORAL at 19:42

## 2019-05-13 ASSESSMENT — ENCOUNTER SYMPTOMS
NECK PAIN: 1
NAUSEA: 0
LIGHT-HEADEDNESS: 0
DIZZINESS: 0
CONFUSION: 1
HEADACHES: 1

## 2019-05-13 NOTE — PLAN OF CARE
PRIMARY DIAGNOSIS: ACUTE Left knee PAIN  OUTPATIENT/OBSERVATION GOALS TO BE MET BEFORE DISCHARGE:  1. Pain Status: Improved-controlled with oral pain medications.    2. Return to near baseline physical activity: No    3. Cleared for discharge by consultants (if involved): No- PT to see in AM    Discharge Planner Nurse   Safe discharge environment identified: Yes  Barriers to discharge: Yes       Entered by: Bridget Roth 05/13/2019 6:06 PM     Please review provider order for any additional goals.   Nurse to notify provider when observation goals have been met and patient is ready for discharge.    Patient is Alert and Oriented x4. Vitals are Temp: 97.7  F (36.5  C) Temp src: Axillary BP: 179/82 Pulse: 60 Heart Rate: 61 Resp: 16 SpO2: 100 % O2 Device: None (Room air)   Pt is complaining of 8/10 pain in their Left knee, Head and neck. LLE with CMS intact. Oxycodone given for pain. Ice applied and elevated. They are 2 assist in bed. Pt has not yet been OOB. Pt is on a Regular diet. Patient is Saline locked.

## 2019-05-13 NOTE — H&P
[facility] Outpatient / Observation Unit  History and Physical Exam     Lo Stewart MRN# [id number]   YOB: 1960 Age: 58 year old      Date of Admission:  5/13/2019    Primary care provider: [facility], [facility]          Assessment:   Lo Stewart is a 58 year old female with a PMH significant for HTN, HLP and depression, who presents with acute left knee pain after a mechanical fall.   Work up in ED reveals: VSS. BMP and CBC are unremarkable. XR of the left femur, knee and pelvis are negative for acute process. CT of the head and cervical spine is negative for acute process. MRI of the pelvis shows no acute findings on the left, incidentally noted possible labral tear and joint effusion on the right. She received 4 mg IV morphine in the ED.  Patient is being registered to observation for further evaluation and continue supportive therapy.    1. Acute left knee pain: radiates up to hip as well. Mmechanical fall coming out of home, fell onto concrete steps. XRs in ED are negative for fx. Bruising and swelling noted to left knee, ROM is reduced due to pain, possibly traumatic bursitis. Ice, elevation and pain control. PT consult tomorrow.   2. Possible concussion - hit right side of head on concrete, bruising noted. Complains of headache and feeling foggy and mildly confused. She is alert and orientated. Monitor  3. HTN - resume home meds with parameters  4. HLP - resume statin           Plan:     1. [place] to Observation  2. Serial pain assessments  3. Initiate ice and activity as tolerated   4. Start scheduled tylenol. Add PRN oxycodone and ibuprofen  5. Encourage ambulation, PT consult   6. DVT prophylaxis: pt at low risk, encourage ambulation  7. Social work consult to ensure safe discharge plan                  Chief Complaint:   Fall, L knee pain, headache         History of Present Illness:   Lo Stewart is a 58 year old female with a PMH significant for HTN, HLP  and depression, who presents with acute left knee pain after a mechanical fall. Pt notes that she was walking out of the house and fell onto the concrete steps. She landed on her left knee and hit the right side of her head. She does not recall how she fell or if she twisted her knee. She is unsure if she lost consciousness after the fall. She notes pain in her left knee that radiates to her hip. She also notes right sided neck and face pain and headache. She denies recent fever, chills, chest pain, SOB, abd pain, nausea, vomiting or dysuria. She notes feeling foggy and mildly confused but is alert and orientated. She is unable to bear weight on the left lower extremity.              Past Medical History:     Past Medical History:   Diagnosis Date     Arthritis      HTN, goal below 140/90      Knee pain, bilateral      Major depression                Past Surgical History:     Past Surgical History:   Procedure Laterality Date     ABDOMEN SURGERY      ovarian cyst removed     COLONOSCOPY N/A 2/24/2015    Procedure: COLONOSCOPY;  Surgeon: Avel New MD;  Location:  GI     LEG SURGERY Right     cyst removed     LUMPECTOMY BREAST Right      ORTHOPEDIC SURGERY      rotator cuff repair right shoulder in July 2015     ORTHOPEDIC SURGERY Right 3/2/2016    right knee surgical repair               Social History:     Social History     Socioeconomic History     Marital status: Single     Spouse name: Not on file     Number of children: Not on file     Years of education: Not on file     Highest education level: Not on file   Occupational History     Not on file   Social Needs     Financial resource strain: Not on file     Food insecurity:     Worry: Not on file     Inability: Not on file     Transportation needs:     Medical: Not on file     Non-medical: Not on file   Tobacco Use     Smoking status: Never Smoker     Smokeless tobacco: Never Used   Substance and Sexual Activity     Alcohol use: Yes     Comment:  rarely     Drug use: No     Sexual activity: Never   Lifestyle     Physical activity:     Days per week: Not on file     Minutes per session: Not on file     Stress: Not on file   Relationships     Social connections:     Talks on phone: Not on file     Gets together: Not on file     Attends Islam service: Not on file     Active member of club or organization: Not on file     Attends meetings of clubs or organizations: Not on file     Relationship status: Not on file     Intimate partner violence:     Fear of current or ex partner: Not on file     Emotionally abused: Not on file     Physically abused: Not on file     Forced sexual activity: Not on file   Other Topics Concern     Parent/sibling w/ CABG, MI or angioplasty before 65F 55M? Not Asked   Social History Narrative    ** Merged History Encounter **                    Family History:     Family History   Problem Relation Age of Onset     Diabetes Mother          74yo     Hypertension Father         born 1934     Depression/Anxiety Sister         and osteoporosis age 35     Hypertension Sister      Other - See Comments Brother          murder 21yo     Other - See Comments Sister          2-3 months old     Other - See Comments Sister          2-3 months old     Family History Negative Brother         6 living brothers healthy     Family History Negative Daughter      Family History Negative Son         2 boys     Diabetes Son 38              Allergies:    No Known Allergies            Medications:     Prior to Admission medications    Medication Sig Last Dose Taking? Auth Provider   ibuprofen (ADVIL/MOTRIN) 200 MG tablet Take 3 tablets (600 mg) by mouth every 6 hours as needed for mild pain or fever   Ricky Reyna,    lisinopril (PRINIVIL/ZESTRIL) 10 MG tablet Take by mouth daily   Reported, Patient              Review of Systems:   A Comprehensive greater than 10 system review of systems was carried out.   Pertinent positives and negatives are noted above.  Otherwise negative for contributory information.            Physical Exam:   Blood pressure 148/83, pulse 65, temperature 97.9  F (36.6  C), temperature source Temporal, resp. rate 12, SpO2 100 %, not currently breastfeeding.    GENERAL:  Comfortable.  PSYCH: pleasant, oriented, No acute distress.  HEENT:  Bruising noted the right temple. normocephalic. Normal conjunctiva, normal hearing, and oropharynx is normal.  NECK:  Supple, no neck vein distention  HEART:  RRR.  LUNGS:  Normal Respiratory effort.   EXTREMITIES:  No pedal edema, +2 pulses bilateral and equal. Bruising and prepatellar swelling to the left knee, able to actively bend the knee but ROM is reduced due to pain.   SKIN:  Dry to touch, No rash, wound or ulcerations.  NEUROLOGIC:  CN 2-12 intact, BL 5/5 symmetric upper and lower extremity strength, sensation is intact with no focal deficits.            Data:     Recent Labs   Lab 05/13/19  1201   WBC 8.3   HGB 12.4   HCT 38.8   MCV 94        Recent Labs   Lab 05/13/19  1201      POTASSIUM 4.0   CHLORIDE 110*   CO2 29   ANIONGAP 3   GLC 97   BUN 17   CR 0.58   GFRESTIMATED >90   GFRESTBLACK >90   PEDRO 8.4*       Recent Results (from the past 48 hour(s))   CT Head w/o Contrast    Narrative    CT SCAN OF THE HEAD WITHOUT CONTRAST May 13, 2019 10:44 AM     HISTORY: Trauma, confusion.    TECHNIQUE: Axial images of the head and coronal reformations without  IV contrast material. Radiation dose for this scan was reduced using  automated exposure control, adjustment of the mA and/or kV according  to patient size, or iterative reconstruction technique.    COMPARISON: Head CT 12/27/2015.    FINDINGS: No evidence of acute intracranial hemorrhage. No mass effect  or midline shift. Nonspecific small cortical calcification in the left  parietal region is unchanged and probably due to previous infectious  or inflammatory insult. Ventricular size is within  normal limits. No  abnormal extra-axial fluid collection.    The visualized portions of the sinuses and mastoids appear normal.    Right frontal scalp soft tissue injury. No underlying calvarial  fracture.      Impression    IMPRESSION: No evidence of acute intracranial hemorrhage, mass, or  herniation.    HEIDY TOLEDO MD   CT Cervical Spine w/o Contrast    Narrative    CT CERVICAL SPINE WITHOUT CONTRAST   5/13/2019 10:48 AM     HISTORY:  Trauma, pain.     TECHNIQUE: Axial images of the cervical spine were obtained without  intravenous contrast. Multiplanar reformations were performed.  Radiation dose for this scan was reduced using automated exposure  control, adjustment of the mA and/or kV according to patient size, or  iterative reconstruction technique.     COMPARISON: None.    FINDINGS: Sagittal images demonstrate normal posterior alignment.  There is no evidence for fracture. Mild multilevel degenerative disc  and facet disease is present. There is no significant central canal  stenosis.      Impression    IMPRESSION: Mild degenerative changes. No evidence for fracture or any  posterior malalignment.    BRYAN ROQUE MD   XR Pelvis 1/2 Views    Narrative    XR PELVIS ONE VIEW 5/13/2019 10:56 AM    HISTORY: fall, leg pain    COMPARISON: None    FINDINGS:  1 view AP pelvis is negative for acute fracture or dislocation. There  is an indeterminate linear approximately 1.8 cm x 0.2 cm long density  in the lower left pelvis. Possibly calcification. A similar density  noted on 12/26/2016 CT scan.      Impression    IMPRESSION: No acute abnormality.    CACHORRO MERCEDES MD   XR Knee Left 3 Views    Narrative    XR KNEE LT 3 VW 5/13/2019 11:06 AM    HISTORY: fall, pain      Impression    IMPRESSION: Mild degenerative change. Exam otherwise negative.    CARITO HOLLAND MD   XR Femur Left 2 Views    Narrative    XR FEMUR LT 2 VW 5/13/2019 12:29 PM    HISTORY: fall, pain      Impression    IMPRESSION: Negative.    CARITO  MD AMALIA   MR Pelvis Bone wo Contrast    Narrative    MR PELVIC BONES WITHOUT CONTRAST   5/13/2019 1:13 PM    HISTORY:  Fall, pain. Evaluate for occult fracture.    TECHNIQUE:  Coronal T1 and inversion recovery, sagittal T1, and  transverse proton density and fat suppressed T2 weighted images.    FINDINGS:   Osseous and Cartilaginous Structures:  No fracture or destructive bone  lesion.  No femoral head osteonecrosis.  No significant hip  osteoarthritis or apparent chondromalacia. Mild degenerative  subchondral edema surrounding the anterior SI joints, best seen on  axial series 7 image 8.      Acetabular Labrum: Abnormal signal in the right superolateral labrum  best seen on coronal series 5 image 11 suspicious for right  superolateral labral tear.    Common Hamstring Tendon: Intact.    Gluteal Tendon Greater Trochanteric Attachments: The gluteus medius  and minimus tendons appear within normal limits and symmetrical.    Trochanteric and Iliopsoas Bursae: No fluid collection in the  trochanteric and iliopsoas bursae.    Joint space: Possible trace right hip joint effusion.      Impression    IMPRESSION:   1. No evidence of bone marrow edema or fracture.  2. Abnormal signal right superolateral labrum suspicious for labral  tear.  3. Possible trace right hip joint effusion.   4. Mild subchondral degenerative changes superior SI joints.         Jacquelyn Bauer PA-C

## 2019-05-13 NOTE — PROGRESS NOTES
ROOM # [id number]    Living Situation (if not independent, order SW consult): Home with family- 2 level house  Facility name:  : [facility]    Activity level at baseline: ind with cane   Activity level on admit: 2 ast      Patient registered to observation; given Patient Bill of Rights; given the opportunity to ask questions about observation status and their plan of care.  Patient has been oriented to the observation room, bathroom and call light is in place.    Discussed discharge goals and expectations with patient/family.

## 2019-05-13 NOTE — ED PROVIDER NOTES
History     Chief Complaint:  Fall    HPI   Belarusian interpretor was used to communicate with the patient and obtain history.     Lo Stewart is a 58 year old female who presents after a fall. The patient reports that she was walking out of her front door this morning when she had what sounds like a mechanical fall and landed on the cement front step outside the door. The patient states she is not exactly sure how the fall happened or how she landed, but now complains of head, neck and left knee pain. She denies any loss of consciousness or any dizziness, visual disturbances, nausea or other symptoms preceding the fall. Currently in the ED the patient states she feels a little confused and her daughter notes that she appears confused to her as well. The patient is not on any blood thinners.     Allergies:  No known drug allergies.     Medications:    Ibuprofen  Lisinopril  Celexa  Naprosyn  Prinzide  Prilosec  Cholecalciferol, Vitamin D3, 2,000 unit tablet    Past Medical History:    Arthritis  Hypertension  Primary osteoarthritis of both knees  Major Depression  Vitamin D Deficiency  Obesity    Past Surgical History:    Ovarian cyst removed  Colonoscopy  Cyst removed from right leg  Right Breast lumpectomy  Rotator cuff repair right shoulder  Right knee surgical repair    Family History:    Diabetes  Hypertension  Anxiety  Depression  Osteoporosis    Social History:  Smoking Status: Never Smoker  Alcohol Use: Rarely  Patient presents via EMS with her daughter and other family members.  Marital Status:  Single     Review of Systems   Gastrointestinal: Negative for nausea.   Musculoskeletal: Positive for neck pain.        + Knee pain   Neurological: Positive for headaches. Negative for dizziness, syncope and light-headedness.   Psychiatric/Behavioral: Positive for confusion.   All other systems reviewed and are negative.    Physical Exam   First Vitals:  BP: 139/89  Pulse: 61  Heart Rate: 55  Temp: 97.9  F  (36.6  C)  Resp: 20  SpO2: 99 %    Physical Exam  General: The patient is alert, in no respiratory distress.    HENT: Mucous membranes moist. Contusion over her right frontal temporal region.    Cardiovascular: Regular rate and rhythm. Good pulses in all four extremities. Normal capillary refill and skin turgor.     Respiratory: Lungs are clear. No nasal flaring. No retractions. No wheezing, no crackles.    Gastrointestinal: Abdomen is non-tender. Abdomen soft. No guarding, no rebound. No palpable hernias.     Musculoskeletal: No gross deformity. C-collar on with mild tenderness. Left knee is tender and abbrated.     Skin: No rashes or petechiae.     Neurologic: The patient is alert and oriented x3. GCS 15. No testable cranial nerve deficit. Follows commands with clear and appropriate speech. Gives appropriate answers. Good strength in all extremities. No gross neurologic deficit. Gross sensation intact. Pupils are round and reactive. No meningismus.     Lymphatic: No cervical adenopathy. No lower extremity swelling.    Psychiatric: The patient is non-tearful.    Emergency Department Course     Imaging:  Radiographic findings were communicated with the patient and family who voiced understanding of the findings.    XR Knee Left 3 Views  Mild degenerative change. Exam otherwise negative.  As read by radiology.    XR Pelvis 1/2 Views  No acute abnormality.  As read by radiology.    CT Cervical Spine w/o Contrast  Mild degenerative changes. No evidence for fracture or any  posterior malalignment.  As read by radiology.    CT Head w/o Contrast  No evidence of acute intracranial hemorrhage, mass, or  herniation.  As read by radiology.    XR Femur Left 2 Views  Negative.  As read by radiology.    MR Pelvis Bone wo Contrast  1. No evidence of bone marrow edema or fracture.  2. Abnormal signal right superolateral labrum suspicious for labral  tear.  3. Possible trace right hip joint effusion.   4. Mild subchondral  degenerative changes superior SI joints.  As read by radiology.    Laboratory:    Glucose by meter: Pending    BMP: Chloride 110 (H), Calcium 8.4 (L), o/w AWNL (Creatinine 0.58)    CBC: AWNL (WBC 8.3, HGB 12.4, )    Interventions:    0932: Sodium chloride 0.9% infusion IV    Emergency Department Course:  Past medical records, nursing notes, and vitals reviewed.  0913: I performed an exam of the patient and obtained history, as documented above.    IV inserted and blood drawn.    The patient was sent for X-rays and CT scans while in the emergency department, findings above.    1138: I rechecked the patient. She was more mentally clear, but complains of pain with standing now. Will subsequently order femur X-ray and MR pelvis.     The patient was sent for femur X-ray and pelvis MR while in the emergency department, findings above.    1337: Rechecked the patient, findings and plan explained to the patient, who consents to admission.     1405: Discussed the patient with Jacquelyn Bauer PA-C for Dr. Beckman, who will admit the patient to a monitored bed for further observation, evaluation, and treatment.    Impression & Plan      Medical Decision Making:  Lo Stewart is a 58 year old female who presented after a fall. The patient was originally quite confused after hitting her head. I did question whether there had been a concussion, however part of that might have been the fentanyl that she had been given. She could not tell me exactly how she had fallen, but there is clear signs of a right temporal contusion. The patient did have significant pain of her left leg when attempting to walk. She should stand, but could not take any steps or put any weight on her left leg. I did consider occult fractures and MRI was ordered. Otherwise she is stable with no signs of intracranial injury.   At this point she is having significant amounts of pain requiring admission to observation status and was admitted in good  condition.     Diagnosis:    ICD-10-CM    1. Fall, initial encounter W19.XXXA    2. Pain of left lower leg M79.662        Disposition:  Admitted to Observation.       Flor Campos  5/13/2019   [facility] EMERGENCY DEPARTMENT  I, Flor Campos, am serving as a scribe at 9:13 AM on 5/13/2019 to document services personally performed by Miquel Walters MD based on my observations and the provider's statements to me.        Miquel Walters MD  05/13/19 [id number]

## 2019-05-13 NOTE — ED NOTES
Bed: ED02  Expected date: 5/13/19  Expected time: 9:02 AM  Means of arrival: Ambulance  Comments:  BV3

## 2019-05-13 NOTE — ED NOTES
[facility]  ED Nurse Handoff Report    Lo Stewart is a 58 year old female   ED Chief complaint: Fall  . ED Diagnosis:   Final diagnoses:   Fall, initial encounter   Pain of left lower leg     Allergies: No Known Allergies    Code Status: Full Code  Activity level - Baseline/Home:  Independent. Activity Level - Current:   Stand with Assist of 2. Lift room needed: No. Bariatric: No   Needed: Yes   Isolation: No. Infection: Not Applicable.     Vital Signs:   Vitals:    05/13/19 1115 05/13/19 1130 05/13/19 1145 05/13/19 1330   BP: 161/83 155/83 168/86 153/80   Pulse: 58 51 63    Resp:    12   Temp:       TempSrc:       SpO2: 99% 99% 99% 100%       Cardiac Rhythm:  ,      Pain level:    Patient confused: No. Patient Falls Risk: Yes.   Elimination Status: Has voided   Patient Report - Initial Complaint: Fall. Focused Assessment: Patient brought in this morning after falling complaining of left knee pain w/ bruising and left forehead bruising w/ bump. Patient unable to bear weight and failed ambulation trial. Patient is [place] speaking with family at bedside. ABC's intact.   Tests Performed: labs, imaging. Abnormal Results:   Labs Ordered and Resulted from Time of ED Arrival Up to the Time of Departure from the ED   BASIC METABOLIC PANEL - Abnormal; Notable for the following components:       Result Value    Chloride 110 (*)     Calcium 8.4 (*)     All other components within normal limits   CBC WITH PLATELETS DIFFERENTIAL   GLUCOSE MONITOR NURSING POCT     MR Pelvis Bone wo Contrast   Preliminary Result   IMPRESSION:    1. No evidence of bone marrow edema or fracture.   2. Abnormal signal right superolateral labrum suspicious for labral   tear.   3. Possible trace right hip joint effusion.    4. Mild subchondral degenerative changes superior SI joints.      XR Femur Left 2 Views   Final Result   IMPRESSION: Negative.      CARITO HOLLAND MD      XR Knee Left 3 Views   Final Result    IMPRESSION: Mild degenerative change. Exam otherwise negative.      CARITO HOLLAND MD      XR Pelvis 1/2 Views   Final Result   IMPRESSION: No acute abnormality.      CACHORRO MERCEDES MD      CT Cervical Spine w/o Contrast   Final Result   IMPRESSION: Mild degenerative changes. No evidence for fracture or any   posterior malalignment.      BRYAN ROQUE MD      CT Head w/o Contrast   Final Result   IMPRESSION: No evidence of acute intracranial hemorrhage, mass, or   herniation.      HEIDY TOLEDO MD         Treatments provided: See MAR  Family Comments: At bedside  OBS brochure/video discussed/provided to patient:  Yes  ED Medications:   Medications   sodium chloride 0.9% infusion ( Intravenous New Bag 5/13/19 8803)   morphine (PF) injection 4 mg (4 mg Intravenous Given 5/13/19 1492)     Drips infusing:  No  For the majority of the shift, the patient's behavior Green. Interventions performed were na.     Severe Sepsis OR Septic Shock Diagnosis Present: No      ED Nurse Name/Phone Number: Landy Reyes,   1:46 PM    RECEIVING UNIT ED HANDOFF REVIEW    Above ED Nurse Handoff Report was reviewed: Yes  Reviewed by: Heike Elkins on May 13, 2019 at 2:48 PM

## 2019-05-13 NOTE — ED NOTES
Removed C-Collar, patient able remove all extremities without numbness or tingling. Patient did complain of left pain hurting when moving left. Patient has a bruise on left knee from falling today.

## 2019-05-13 NOTE — PHARMACY-ADMISSION MEDICATION HISTORY
Admission medication history interview status for this patient is complete. See [facility] admission navigator for allergy information, prior to admission medications and immunization status.     Medication history interview source(s):Patient and Family  - family interpreted for patient  Medication history resources (including written lists, pill bottles, clinic record):called pharmacy  [facility]    Changes made to PTA medication list:  Added: all meds  Deleted:lisinopril plain  Changed:     Actions taken by pharmacist (provider contacted, etc):None     Additional medication history information:   Per pharmacy last med fills were in March for 30 day supply- in epic can see fills in January as well.  Pharmacy said patient lost insurance so that may be why can't see March fills    Medication reconciliation/reorder completed by provider prior to medication history? No    Do you take OTC medications (eg tylenol, ibuprofen, fish oil, eye/ear drops, etc)? Y(Y/N)    For patients on insulin therapy: NA (Y/N)      Prior to Admission medications    Medication Sig Last Dose Taking? Auth Provider   amLODIPine (NORVASC) 10 MG tablet Take 10 mg by mouth daily 5/12/2019 at Unknown time Yes Unknown, Entered By History   atorvastatin (LIPITOR) 40 MG tablet Take 40 mg by mouth daily 5/12/2019 at Unknown time Yes Unknown, Entered By History   citalopram (CELEXA) 40 MG tablet Take 40 mg by mouth daily 5/12/2019 at Unknown time Yes Unknown, Entered By History   ibuprofen (ADVIL/MOTRIN) 200 MG tablet Take 200 mg by mouth every 6 hours as needed for mild pain  Yes Unknown, Entered By History   lisinopril-hydrochlorothiazide (PRINZIDE/ZESTORETIC) 20-25 MG tablet Take 1 tablet by mouth daily 5/12/2019 at Unknown time Yes Unknown, Entered By History   NONFORMULARY Take by mouth daily MVI pack- contains 5-6 things 5/12/2019 at Unknown time Yes Unknown, Entered By History

## 2019-05-14 ENCOUNTER — OFFICE VISIT (OUTPATIENT)
Dept: INTERPRETER SERVICES | Facility: CLINIC | Age: 59
End: 2019-05-14
Payer: COMMERCIAL

## 2019-05-14 ENCOUNTER — APPOINTMENT (OUTPATIENT)
Dept: PHYSICAL THERAPY | Facility: CLINIC | Age: 59
End: 2019-05-14
Attending: PHYSICIAN ASSISTANT
Payer: COMMERCIAL

## 2019-05-14 PROCEDURE — 25000132 ZZH RX MED GY IP 250 OP 250 PS 637: Performed by: PHYSICIAN ASSISTANT

## 2019-05-14 PROCEDURE — G0378 HOSPITAL OBSERVATION PER HR: HCPCS

## 2019-05-14 PROCEDURE — T1013 SIGN LANG/ORAL INTERPRETER: HCPCS | Mod: U3

## 2019-05-14 PROCEDURE — 97530 THERAPEUTIC ACTIVITIES: CPT | Mod: GP | Performed by: PHYSICAL THERAPIST

## 2019-05-14 PROCEDURE — 99226 ZZC SUBSEQUENT OBSERVATION CARE,LEVEL III: CPT | Performed by: PHYSICIAN ASSISTANT

## 2019-05-14 PROCEDURE — 97161 PT EVAL LOW COMPLEX 20 MIN: CPT | Mod: GP | Performed by: PHYSICAL THERAPIST

## 2019-05-14 RX ORDER — AMOXICILLIN 250 MG
1-2 CAPSULE ORAL 2 TIMES DAILY PRN
Status: DISCONTINUED | OUTPATIENT
Start: 2019-05-14 | End: 2019-05-16 | Stop reason: HOSPADM

## 2019-05-14 RX ORDER — CYCLOBENZAPRINE HCL 10 MG
10 TABLET ORAL 3 TIMES DAILY PRN
Status: DISCONTINUED | OUTPATIENT
Start: 2019-05-14 | End: 2019-05-16 | Stop reason: HOSPADM

## 2019-05-14 RX ORDER — IBUPROFEN 600 MG/1
600 TABLET, FILM COATED ORAL EVERY 6 HOURS
Status: DISCONTINUED | OUTPATIENT
Start: 2019-05-14 | End: 2019-05-15

## 2019-05-14 RX ORDER — ACETAMINOPHEN 325 MG/1
650 TABLET ORAL EVERY 6 HOURS
Status: DISCONTINUED | OUTPATIENT
Start: 2019-05-14 | End: 2019-05-16 | Stop reason: HOSPADM

## 2019-05-14 RX ORDER — LISINOPRIL 20 MG/1
20 TABLET ORAL DAILY
Status: DISCONTINUED | OUTPATIENT
Start: 2019-05-14 | End: 2019-05-15

## 2019-05-14 RX ADMIN — CITALOPRAM HYDROBROMIDE 40 MG: 20 TABLET ORAL at 07:58

## 2019-05-14 RX ADMIN — IBUPROFEN 600 MG: 600 TABLET ORAL at 23:37

## 2019-05-14 RX ADMIN — OXYCODONE HYDROCHLORIDE 10 MG: 5 TABLET ORAL at 15:10

## 2019-05-14 RX ADMIN — OXYCODONE HYDROCHLORIDE 5 MG: 5 TABLET ORAL at 22:11

## 2019-05-14 RX ADMIN — HYDROXYZINE HYDROCHLORIDE 25 MG: 25 TABLET ORAL at 08:58

## 2019-05-14 RX ADMIN — DICLOFENAC 2 G: 10 GEL TOPICAL at 22:15

## 2019-05-14 RX ADMIN — ACETAMINOPHEN 650 MG: 325 TABLET, FILM COATED ORAL at 22:11

## 2019-05-14 RX ADMIN — ACETAMINOPHEN 650 MG: 325 TABLET, FILM COATED ORAL at 14:06

## 2019-05-14 RX ADMIN — HYDROXYZINE HYDROCHLORIDE 25 MG: 25 TABLET ORAL at 17:20

## 2019-05-14 RX ADMIN — DICLOFENAC 2 G: 10 GEL TOPICAL at 14:06

## 2019-05-14 RX ADMIN — IBUPROFEN 600 MG: 600 TABLET ORAL at 12:20

## 2019-05-14 RX ADMIN — OXYCODONE HYDROCHLORIDE 10 MG: 5 TABLET ORAL at 10:49

## 2019-05-14 RX ADMIN — ACETAMINOPHEN 1000 MG: 500 TABLET, FILM COATED ORAL at 07:58

## 2019-05-14 RX ADMIN — OXYCODONE HYDROCHLORIDE 5 MG: 5 TABLET ORAL at 03:27

## 2019-05-14 RX ADMIN — CYCLOBENZAPRINE HYDROCHLORIDE 10 MG: 10 TABLET, FILM COATED ORAL at 12:20

## 2019-05-14 RX ADMIN — IBUPROFEN 600 MG: 600 TABLET ORAL at 17:21

## 2019-05-14 RX ADMIN — ATORVASTATIN CALCIUM 40 MG: 40 TABLET, FILM COATED ORAL at 22:12

## 2019-05-14 RX ADMIN — OXYCODONE HYDROCHLORIDE 5 MG: 5 TABLET ORAL at 07:58

## 2019-05-14 NOTE — PLAN OF CARE
PRIMARY DIAGNOSIS: ACUTE PAIN- Left knee pain  OUTPATIENT/OBSERVATION GOALS TO BE MET BEFORE DISCHARGE:  1. Pain Status: Improved-controlled with oral pain medications.    2. Return to near baseline physical activity: No    3. Cleared for discharge by consultants (if involved): No    Discharge Planner Nurse   Safe discharge environment identified: Yes  Barriers to discharge: Yes       Entered by: Heike Elkins 05/14/2019 5:53 PM     Please review provider order for any additional goals.   Nurse to notify provider when observation goals have been met and patient is ready for discharge.    Patient took a couple of steps at bedside- pt felt dizzy and painful- VSS.

## 2019-05-14 NOTE — PROGRESS NOTES
[facility]      OUTPATIENT PHYSICAL THERAPY EVALUATION  PLAN OF TREATMENT FOR OUTPATIENT REHABILITATION  (COMPLETE FOR INITIAL CLAIMS ONLY)  Patient's Last Name, First Name, M.I.  YOB: 1960  Willy Lo Stewart                           Provider's Name  [facility] Medical Record No.  [id number]                               Onset Date:  05/13/19   Start of Care Date:  05/14/19      Type:     _X_PT   ___OT   ___SLP Medical Diagnosis:  Left knee hematoma                        PT Diagnosis:  decreased functional mobility due to concussion and L knee pain   Visits from SOC:  1   _________________________________________________________________________________  Plan of Treatment/Functional Goals    Planned Interventions: Cryotherapy,    balance training, bed mobility training, gait training, strengthening, transfer training, risk factor education, home program guidelines, progressive activity/exercise,       Goals: See Physical Therapy Goals on Care Plan in [employer] electronic health record.    Therapy Frequency: daily  Predicted Duration of Therapy Intervention: 2-3 days  _________________________________________________________________________________    I CERTIFY THE NEED FOR THESE SERVICES FURNISHED UNDER        THIS PLAN OF TREATMENT AND WHILE UNDER MY CARE     (Physician co-signature of this document indicates review and certification of the therapy plan).                Certification date from: 05/14/19, Certification date to: 05/17/19    Referring Physician: Juancarlos            Initial Assessment        See Physical Therapy evaluation dated 05/14/19 in Epic electronic health record.

## 2019-05-14 NOTE — PLAN OF CARE
PRIMARY DIAGNOSIS: ACUTE PAIN  OUTPATIENT/OBSERVATION GOALS TO BE MET BEFORE DISCHARGE:  1. Pain Status: Improved-controlled with oral pain medications.    2. Return to near baseline physical activity: No    3. Cleared for discharge by consultants (if involved): No    Discharge Planner Nurse   Safe discharge environment identified: No  Barriers to discharge: Yes       Entered by: Heike Elkins 05/14/2019 8:42 AM     Please review provider order for any additional goals.   Nurse to notify provider when observation goals have been met and patient is ready for discharge.

## 2019-05-14 NOTE — PLAN OF CARE
PRIMARY DIAGNOSIS: ACUTE PAIN- Left Knee pain  OUTPATIENT/OBSERVATION GOALS TO BE MET BEFORE DISCHARGE:  1. Pain Status: Improved-controlled with oral pain medications.- increase oxycodone amount    2. Return to near baseline physical activity: No    3. Cleared for discharge by consultants (if involved): No    Discharge Planner Nurse   Safe discharge environment identified: No  Barriers to discharge: Yes       Entered by: Heike Elkins 05/14/2019 11:25 AM     Please review provider order for any additional goals.   Nurse to notify provider when observation goals have been met and patient is ready for discharge.

## 2019-05-14 NOTE — PLAN OF CARE
PRIMARY DIAGNOSIS: ACUTE Left knee PAIN  OUTPATIENT/OBSERVATION GOALS TO BE MET BEFORE DISCHARGE:  1. Pain Status: Improved-controlled with oral pain medications.     2. Return to near baseline physical activity: No     3. Cleared for discharge by consultants (if involved): No- PT to see in AM     Discharge Planner Nurse   Safe discharge environment identified: Yes  Barriers to discharge: Yes       Entered by: Clarisse Roldan  05/14/2019 12:04 AM  Please review provider order for any additional goals.   Nurse to notify provider when observation goals have been met and patient is ready for discharge.

## 2019-05-14 NOTE — PROGRESS NOTES
[facility]  Hospitalist Progress Note  Lashae Thacker PA-C 05/14/2019    Reason for Stay (Diagnosis): Left knee hematoma and concussion         Assessment and Plan:      Summary of Stay: Lo Stewart is a 58 year old female admitted on 5/13/2019 after a mechanical fall landing on her knee and head. She had extensive imaging in the ED including a CT head, CT C spine, X ray pelvis, X ray left knee, X ray left, and MRI pelvis which did not show any fractures. Her left knee is swollen and bruised with significant tenderness to palpation. She also has concussive symptoms with headache, blurred vision and dizziness.     Problem List:   1. Left knee hematoma s/p mechanical fall- Mechanical fall on 5/13 with significant trauma to left knee. Xray imaging of hip, femur and left knee are negative. MRI of pelvis also negative. PT assessed and patient only able to take a couple of steps due to pain.  -Elevated left knee  -Ice frequently  -Alternate Tylenol with Ibuprofen  -Voltaren gel  -Atarax and Oxycodone PRN  -PT    2. Concussion - Head trauma after mechanical fall. CT head and CT C spine negative. Now complains of headache, dizziness and blurred vision consistent with concussion.  -Pain meds as above  -Eye rest  -Flexeril for right neck muscle strain  -Will need concussion referral at discharge    3. HTN  Takes PTA Prinzide and Amlodipine  -Hold hydrochlorothiazide and resume Lisinopril 20 mg  -Resume PTA Amlodipine    4. HLP  -Resume PTA statin      DVT Prophylaxis: Low Risk/Ambulatory with no VTE prophylaxis indicated  Code Status: Full Code  Discharge Dispo: Anticipate discharge tomorrow unless TCU is needed          Interval History (Subjective):      Reports improvement in knee pain but still very painful with ambulation and movement. Feels dizzy with eyes open with blurred vision, difficulty focusing and headache. No nausea or vomiting                  Physical Exam:      Last Vital  Signs:  /77   Pulse 59   Temp 97.1  F (36.2  C) (Oral)   Resp 14   SpO2 98%       Intake/Output Summary (Last 24 hours) at 5/14/2019 1237  Last data filed at 5/13/2019 1544  Gross per 24 hour   Intake --   Output 250 ml   Net -250 ml       Constitutional: Awake, alert, cooperative, no apparent distress   Respiratory: Clear to auscultation bilaterally, no crackles or wheezing   Cardiovascular: Regular rate and rhythm, normal S1 and S2, and no murmur noted   Abdomen: Normal bowel sounds, soft, non-distended, non-tender   Skin: No rashes, no cyanosis, dry to touch   Neuro: Alert and oriented x3, if eyes are open she is squinting, some darting extraocular eye motions on exam   Extremities: Left knee is bruised and swollen   Other(s):        All other systems: Negative          Medications:      All current medications were reviewed with changes reflected in problem list.         Data:      All new lab and imaging data was reviewed.   Labs:  Recent Labs   Lab 05/13/19  1201      POTASSIUM 4.0   CHLORIDE 110*   CO2 29   ANIONGAP 3   GLC 97   BUN 17   CR 0.58   GFRESTIMATED >90   GFRESTBLACK >90   PEDRO 8.4*     Recent Labs   Lab 05/13/19  1201   WBC 8.3   HGB 12.4   HCT 38.8   MCV 94        No results for input(s): COLOR, APPEARANCE, URINEGLC, URINEBILI, URINEKETONE, SG, UBLD, URINEPH, PROTEIN, UROBILINOGEN, NITRITE, LEUKEST, RBCU, WBCU in the last 168 hours.   Imaging:   Recent Results (from the past 24 hour(s))   MR Pelvis Bone wo Contrast    Narrative    MR PELVIC BONES WITHOUT CONTRAST   5/13/2019 1:13 PM    HISTORY:  Fall, pain. Evaluate for occult fracture.    TECHNIQUE:  Coronal T1 and inversion recovery, sagittal T1, and  transverse proton density and fat suppressed T2 weighted images.    FINDINGS:   Osseous and Cartilaginous Structures:  No fracture or destructive bone  lesion.  No femoral head osteonecrosis.  No significant hip  osteoarthritis or apparent chondromalacia. Mild  degenerative  subchondral edema surrounding the anterior SI joints, best seen on  axial series 7 image 8.      Acetabular Labrum: Abnormal signal in the right superolateral labrum  best seen on coronal series 5 image 11 suspicious for right  superolateral labral tear.    Common Hamstring Tendon: Intact.    Gluteal Tendon Greater Trochanteric Attachments: The gluteus medius  and minimus tendons appear within normal limits and symmetrical.    Trochanteric and Iliopsoas Bursae: No fluid collection in the  trochanteric and iliopsoas bursae.    Joint space: Possible trace right hip joint effusion.      Impression    IMPRESSION:   1. No evidence of bone marrow edema or fracture.  2. Abnormal signal right superolateral labrum suspicious for labral  tear.  3. Possible trace right hip joint effusion.   4. Mild subchondral degenerative changes superior SI joints.    ANIRUDH GROVE MD

## 2019-05-14 NOTE — PROGRESS NOTES
05/14/19 0935   Quick Adds   Quick Adds Certification   Type of Visit Initial PT Evaluation   Living Environment   Lives With child(janina), adult   Living Arrangements house   Home Accessibility stairs to enter home;stairs within home   Number of Stairs, Main Entrance 1   Stair Railings, Main Entrance none   Number of Stairs, Within Home, Primary 10  (5 then a landing and 5 more)   Stair Railings, Within Home, Primary railings safe and in good condition   Self-Care   Regular Exercise Yes   Activity/Exercise Type strength training   Exercise Amount/Frequency daily   Equipment Currently Used at Home cane, straight   Functional Level Prior   Ambulation 0-->independent   Transferring 0-->independent   Toileting 0-->independent   Bathing 0-->independent   Communication 0-->understands/communicates without difficulty   Swallowing 0-->swallows foods/liquids without difficulty   Cognition 0 - no cognition issues reported   Fall history within last six months yes   Number of times patient has fallen within last six months 1   General Information   Onset of Illness/Injury or Date of Surgery - Date 05/13/19   Referring Physician Juancarlos   Patient/Family Goals Statement Pt is hoping to DC home   Pertinent History of Current Problem (include personal factors and/or comorbidities that impact the POC) Lo Stewart is a 58 year old female with a PMH significant for HTN, HLP and depression, who presents with acute left knee pain after a mechanical fall.    Precautions/Limitations fall precautions   Cognitive Status Examination   Orientation orientation to person, place and time   Level of Consciousness alert   Follows Commands and Answers Questions 100% of the time;able to follow multistep instructions   Personal Safety and Judgment intact   Memory intact   Pain Assessment   Patient Currently in Pain Yes, see Vital Sign flowsheet  (L knee pain, headache)   Integumentary/Edema   Integumentary/Edema Comments L knee effusion  "noted with bruising, bilateral ankle swelling   Range of Motion (ROM)   ROM Comment limited ROM with L knee flexion 5-60   Strength   Strength Comments limited 3-/5 in R LE with SLR   Bed Mobility   Bed Mobility Comments min A   Transfer Skills   Transfer Comments min A   Gait   Gait Comments limited to 2-3 steps   Balance   Balance Comments poor ability to wt bear int L LE   Modality Interventions   Planned Modality Interventions Cryotherapy   General Therapy Interventions   Planned Therapy Interventions balance training;bed mobility training;gait training;strengthening;transfer training;risk factor education;home program guidelines;progressive activity/exercise   Clinical Impression   Criteria for Skilled Therapeutic Intervention yes, treatment indicated   PT Diagnosis decreased functional mobility due to concussion and L knee pain   Influenced by the following impairments pain, dizziness, decreased ROM, decreased strength   Functional limitations due to impairments decreased transfers, ambulation, bed mob   Clinical Presentation Evolving/Changing   Clinical Presentation Rationale continued symptoms   Clinical Decision Making (Complexity) Low complexity   Therapy Frequency` daily   Predicted Duration of Therapy Intervention (days/wks) 2-3 days   Anticipated Equipment Needs at Discharge front wheeled walker;shower chair   Anticipated Discharge Disposition Transitional Care Facility   Risk & Benefits of therapy have been explained Yes   Patient, Family & other staff in agreement with plan of care Yes   Therapy Certification   Start of care date 05/14/19   Certification date from 05/14/19   Certification date to 05/17/19   Medical Diagnosis Left knee hematoma   [facility] [employer] \"6 Clicks\"   2016, Trustees of [facility], under license to [employer].  All rights reserved.   6 Clicks Short Forms Basic Mobility Inpatient Short Form   [facility] AM-PAC  \"6 Clicks\" V.2 Basic Mobility Inpatient " Short Form   1. Turning from your back to your side while in a flat bed without using bedrails? 2 - A Lot   2. Moving from lying on your back to sitting on the side of a flat bed without using bedrails? 2 - A Lot   3. Moving to and from a bed to a chair (including a wheelchair)? 2 - A Lot   4. Standing up from a chair using your arms (e.g., wheelchair, or bedside chair)? 2 - A Lot   5. To walk in hospital room? 2 - A Lot   6. Climbing 3-5 steps with a railing? 1 - Total   Basic Mobility Raw Score (Score out of 24.Lower scores equate to lower levels of function) 11   Total Evaluation Time   Total Evaluation Time (Minutes) 10

## 2019-05-14 NOTE — PLAN OF CARE
PRIMARY DIAGNOSIS: ACUTE Left knee PAIN  OUTPATIENT/OBSERVATION GOALS TO BE MET BEFORE DISCHARGE:  1. Pain Status: Improved-controlled with oral pain medications.     2. Return to near baseline physical activity: No     3. Cleared for discharge by consultants (if involved): No- PT to see in AM     Discharge Planner Nurse   Safe discharge environment identified: Yes  Barriers to discharge: Yes       Entered by: Clarisse Roldan  05/14/2019 5:30 AM

## 2019-05-14 NOTE — PLAN OF CARE
PT: PT eval completed. Pt here with fall, L knee pain and possible concussion. Pt lives with dtg in 2 level home (basement) with 1 stair to enter home. Pt currently having increased dizziness, light sensitivity, no nystagmus currently, limited L knee ROM, pain and swelling. Pt does have PMH of meniscal tear and repair on the R side, and mild knee pain prior to fall on the L.   Discharge Planner PT   Patient plan for discharge: none stated  Current status: Pt limited with mobility. Pt needing CGA/min A for supine to sit. Pt was dizzy with sitting up and standing, BP elevated with change in position. Pt cued for use of FWW with sit to/from stand, min A. Pt able to perform min wt bearing through the L LE with 2-3 steps forward. Dizziness and pain limiting tolerance to ambulation. Pt needing min A x 2 for return to supine. Pt more alert and EO following small amount of activity. Pt educated on concussion sxs, light sensitivity, HA, difficulty with busy environments, dizziness. Pt educated gradual movement will help improve symptoms of concussion. Pt educated to work with RN on mobility- sitting up in chair and small ambulation as tolerated today.   Barriers to return to prior living situation: limited mobility  Recommendations for discharge:  TCU if to discharge today, will see for further treatment and assessment tomorrow. Would recommend OP vestibular physical therapy, home with Wide FWW, shower chair if DISCHARGE home.   Rationale for recommendations: Pt limited mobility currently, needing min A for bed mob and transfers.        Entered by: Jenny Walter 05/14/2019 10:29 AM

## 2019-05-15 ENCOUNTER — APPOINTMENT (OUTPATIENT)
Dept: PHYSICAL THERAPY | Facility: CLINIC | Age: 59
End: 2019-05-15
Payer: COMMERCIAL

## 2019-05-15 ENCOUNTER — OFFICE VISIT (OUTPATIENT)
Dept: INTERPRETER SERVICES | Facility: CLINIC | Age: 59
End: 2019-05-15
Payer: COMMERCIAL

## 2019-05-15 LAB
ANION GAP SERPL CALCULATED.3IONS-SCNC: 4 MMOL/L (ref 3–14)
BUN SERPL-MCNC: 28 MG/DL (ref 7–30)
CALCIUM SERPL-MCNC: 8.5 MG/DL (ref 8.5–10.1)
CHLORIDE SERPL-SCNC: 106 MMOL/L (ref 94–109)
CO2 SERPL-SCNC: 31 MMOL/L (ref 20–32)
CREAT SERPL-MCNC: 1.07 MG/DL (ref 0.52–1.04)
GFR SERPL CREATININE-BSD FRML MDRD: 57 ML/MIN/{1.73_M2}
GLUCOSE SERPL-MCNC: 92 MG/DL (ref 70–99)
POTASSIUM SERPL-SCNC: 3.9 MMOL/L (ref 3.4–5.3)
SODIUM SERPL-SCNC: 141 MMOL/L (ref 133–144)

## 2019-05-15 PROCEDURE — 25000128 H RX IP 250 OP 636: Performed by: PHYSICIAN ASSISTANT

## 2019-05-15 PROCEDURE — 25000132 ZZH RX MED GY IP 250 OP 250 PS 637: Performed by: PHYSICIAN ASSISTANT

## 2019-05-15 PROCEDURE — 97116 GAIT TRAINING THERAPY: CPT | Mod: GP

## 2019-05-15 PROCEDURE — T1013 SIGN LANG/ORAL INTERPRETER: HCPCS | Mod: U3,59

## 2019-05-15 PROCEDURE — T1013 SIGN LANG/ORAL INTERPRETER: HCPCS | Mod: U3

## 2019-05-15 PROCEDURE — 25800030 ZZH RX IP 258 OP 636: Performed by: PHYSICIAN ASSISTANT

## 2019-05-15 PROCEDURE — 96372 THER/PROPH/DIAG INJ SC/IM: CPT

## 2019-05-15 PROCEDURE — 97530 THERAPEUTIC ACTIVITIES: CPT | Mod: GP

## 2019-05-15 PROCEDURE — 80048 BASIC METABOLIC PNL TOTAL CA: CPT | Performed by: PHYSICIAN ASSISTANT

## 2019-05-15 PROCEDURE — 36415 COLL VENOUS BLD VENIPUNCTURE: CPT | Performed by: PHYSICIAN ASSISTANT

## 2019-05-15 PROCEDURE — 25000132 ZZH RX MED GY IP 250 OP 250 PS 637: Performed by: INTERNAL MEDICINE

## 2019-05-15 PROCEDURE — G0378 HOSPITAL OBSERVATION PER HR: HCPCS

## 2019-05-15 PROCEDURE — 99226 ZZC SUBSEQUENT OBSERVATION CARE,LEVEL III: CPT | Performed by: PHYSICIAN ASSISTANT

## 2019-05-15 RX ORDER — SODIUM CHLORIDE 9 MG/ML
INJECTION, SOLUTION INTRAVENOUS CONTINUOUS
Status: DISCONTINUED | OUTPATIENT
Start: 2019-05-15 | End: 2019-05-16 | Stop reason: HOSPADM

## 2019-05-15 RX ADMIN — HYDROXYZINE HYDROCHLORIDE 25 MG: 25 TABLET ORAL at 16:42

## 2019-05-15 RX ADMIN — AMLODIPINE BESYLATE 10 MG: 10 TABLET ORAL at 08:48

## 2019-05-15 RX ADMIN — DICLOFENAC 2 G: 10 GEL TOPICAL at 08:49

## 2019-05-15 RX ADMIN — ACETAMINOPHEN 650 MG: 325 TABLET, FILM COATED ORAL at 10:37

## 2019-05-15 RX ADMIN — ACETAMINOPHEN 650 MG: 325 TABLET, FILM COATED ORAL at 21:21

## 2019-05-15 RX ADMIN — SODIUM CHLORIDE: 9 INJECTION, SOLUTION INTRAVENOUS at 14:10

## 2019-05-15 RX ADMIN — OXYCODONE HYDROCHLORIDE 10 MG: 5 TABLET ORAL at 08:48

## 2019-05-15 RX ADMIN — ACETAMINOPHEN 650 MG: 325 TABLET, FILM COATED ORAL at 16:41

## 2019-05-15 RX ADMIN — OXYCODONE HYDROCHLORIDE 10 MG: 5 TABLET ORAL at 21:22

## 2019-05-15 RX ADMIN — ENOXAPARIN SODIUM 40 MG: 40 INJECTION SUBCUTANEOUS at 14:10

## 2019-05-15 RX ADMIN — ACETAMINOPHEN 650 MG: 325 TABLET, FILM COATED ORAL at 04:15

## 2019-05-15 RX ADMIN — ATORVASTATIN CALCIUM 40 MG: 40 TABLET, FILM COATED ORAL at 21:22

## 2019-05-15 RX ADMIN — CITALOPRAM HYDROBROMIDE 40 MG: 20 TABLET ORAL at 08:48

## 2019-05-15 RX ADMIN — POLYETHYLENE GLYCOL 3350 17 G: 17 POWDER, FOR SOLUTION ORAL at 16:42

## 2019-05-15 RX ADMIN — DICLOFENAC 2 G: 10 GEL TOPICAL at 16:46

## 2019-05-15 RX ADMIN — SENNOSIDES AND DOCUSATE SODIUM 1 TABLET: 8.6; 5 TABLET ORAL at 08:48

## 2019-05-15 RX ADMIN — DICLOFENAC 2 G: 10 GEL TOPICAL at 21:28

## 2019-05-15 RX ADMIN — OXYCODONE HYDROCHLORIDE 10 MG: 5 TABLET ORAL at 14:10

## 2019-05-15 NOTE — PROGRESS NOTES
Discharge Planner   Discharge Plans in progress: [facility] TCU.  Barriers to discharge plan: None anticipated.   Follow up plan: [facility] TCU tomorrow. HE WC transport to be arranged. Facility updated.        Entered by: Maureen Rios 05/15/2019 1:04 PM

## 2019-05-15 NOTE — PROGRESS NOTES
[facility]  Hospitalist Progress Note  Lashae Thacker PA-C 05/15/2019    Reason for Stay (Diagnosis): Left knee pain, concussion and DIETER         Assessment and Plan:      Summary of Stay: Lo Stewart is a 58 year old female admitted on 5/13/2019 after a mechanical fall landing on her knee and head. She had extensive imaging in the ED including a CT head, CT C spine, X ray pelvis, X ray left knee, X ray left, and MRI pelvis which did not show any fractures. Her left knee is swollen and bruised with significant tenderness to palpation. She also has concussive symptoms with headache, blurred vision and dizziness. All symptoms are improving today but her kidney function has almost doubled likely related to dehydration and NSAID use. She was evaluated by PT who are recommending rehab.      Problem List:   1. Left knee hematoma s/p mechanical fall- Mechanical fall on 5/13 with significant trauma to left knee. Xray imaging of hip, femur and left knee are negative. MRI of pelvis also negative. PT assessed and patient only able to take a couple of steps due to pain so recommend rehab.  -Elevated left knee  -Ice frequently  -Schedule Tylenol  -Voltaren gel  -Atarax and Oxycodone PRN  -Rehab likely tomorrow  -Recommend discharge on Lovenox     2. Concussion - Head trauma after mechanical fall. CT head and CT C spine negative. Now complains of headache, dizziness and blurred vision consistent with concussion.  -Pain meds as above  -Eye rest  -Flexeril for right neck muscle strain  -Will need concussion referral at discharge    3. DIETER  Creatinine on arrival of 0.58 and now 1.07. Likely related to dehydration and NSAID use.  -Fluids overnight  -Stop NSAIDS  -Avoid nephrotoxins  -Recheck BMP tomorrow     4. HTN  Takes PTA Prinzide and Amlodipine  -Hold hydrochlorothiazide and resume Lisinopril 20 mg  -Resume PTA Amlodipine     5. HLP  -Resume PTA statin            Interval History (Subjective):       Reports improvement in left knee pain and post concussive symptoms but still unable to ambulate more than a few feet.                   Physical Exam:      Last Vital Signs:  BP 99/51 (BP Location: Right arm)   Pulse 54   Temp 96.8  F (36  C) (Oral)   Resp 12   SpO2 94%     No intake or output data in the 24 hours ending 05/15/19 1335    Constitutional: Awake, alert, cooperative, no apparent distress   Respiratory: Clear to auscultation bilaterally, no crackles or wheezing   Cardiovascular: Regular rate and rhythm, normal S1 and S2, and no murmur noted   Abdomen: Normal bowel sounds, soft, non-distended, non-tender   Skin: No rashes, no cyanosis, dry to touch   Neuro: Alert and oriented x3, no weakness, numbness, memory loss.    Extremities: Left lower knee is swollen, ecchymotic and tender to palpation.    Other(s):        All other systems: Negative          Medications:      All current medications were reviewed with changes reflected in problem list.         Data:      All new lab and imaging data was reviewed.   Labs:  Recent Labs   Lab 05/15/19  0617      POTASSIUM 3.9   CHLORIDE 106   CO2 31   ANIONGAP 4   GLC 92   BUN 28   CR 1.07*   GFRESTIMATED 57*   GFRESTBLACK 66   PEDRO 8.5     Recent Labs   Lab 05/13/19  1201   WBC 8.3   HGB 12.4   HCT 38.8   MCV 94        No results for input(s): COLOR, APPEARANCE, URINEGLC, URINEBILI, URINEKETONE, SG, UBLD, URINEPH, PROTEIN, UROBILINOGEN, NITRITE, LEUKEST, RBCU, WBCU in the last 168 hours.   Imaging:   No results found for this or any previous visit (from the past 24 hour(s)).

## 2019-05-15 NOTE — PLAN OF CARE
Discharge Planner PT   Patient plan for discharge: agreeable to TCU at end of session  Current status: Patient supine upon arrival.  Required min A to transfer to sitting at EOB.  Patient required min A to transfer to standing.  Reported pain at left knee and increased dizziness.  Dizziness subsided within 60 sec.  Patient amb 15 feet within room with CGA and 2WW.  Patient with significantly slow gait speed, limited weight bearing through left LE.  Patient wincing in pain, required frequent and direct cueing to keep eyes open during mobility. Patient required min A to return to bed.  Discussed patient's home set up and what mobility she would be required to perform at discharge.  Patient and daughter indicated that patient would not be able to negotiate home to access bedroom/bathroom multiple times per day.  Barriers to return to prior living situation: limited mobility, fall risk  Recommendations for discharge:  TCU  Rationale for recommendations: Patient currently presents below baseline for functional mobility secondary to left LE pain and increased dizziness following a fall.  Patient currently unable to ambulate distance required to enter home (from car) and to access bathroom from bedroom multiple times per day.  Recommend TCU to increase activity tolerance and independence with mobility.       Entered by: Kaylynn Ponce 05/15/2019 1:08 PM

## 2019-05-15 NOTE — PROGRESS NOTES
Your information has been submitted on May 15th, 2019 at 02:36:05 PM CDT. The confirmation number is [id number]

## 2019-05-15 NOTE — CONSULTS
Brief SW note:     SW met with pt for discharge planning to TCU.  used. Dtr & granddaughter at bedside. Facility preference for TCU include [facility], [facility], and [facility]. Shared room preferred. Referrals sent. HE  transport requested at discharge.Reviewed out of pocket cost for [facility] transport, $75 for base rate and $5 per mile to the destination. Pt/family expressed understanding and are agreeable to this. SW to continue following.

## 2019-05-15 NOTE — PLAN OF CARE
PRIMARY DIAGNOSIS: ACUTE PAIN- Left knee pain  OUTPATIENT/OBSERVATION GOALS TO BE MET BEFORE DISCHARGE:  1. Pain Status: Improved-controlled with oral pain medications.    2. Return to near baseline physical activity: No    3. Cleared for discharge by consultants (if involved): No    Discharge Planner Nurse   Safe discharge environment identified: Yes  Barriers to discharge: Yes       Entered by: Kelly Baptiste 05/15/2019 12:33 AM       A&Ox4, VSS on RA. Pain controlled with oxycodone, scheduled tylenol & ibuprofen. Voltaren gel also applied to L knee. Assist x1 with walker, pt using bed pan. L knee elevating with wedge. PT consult. Will continue to monitor.     Temp: 96.6  F (35.9  C) Temp src: Oral BP: 110/56 Pulse: 54 Heart Rate: 58 Resp: 16 SpO2: 95 % O2 Device: None (Room air)         Please review provider order for any additional goals.   Nurse to notify provider when observation goals have been met and patient is ready for discharge.

## 2019-05-15 NOTE — PLAN OF CARE
PRIMARY DIAGNOSIS: ACUTE PAIN- Left knee pain  OUTPATIENT/OBSERVATION GOALS TO BE MET BEFORE DISCHARGE:  1. Pain Status: Improved-controlled with oral pain medications.    2. Return to near baseline physical activity: No    3. Cleared for discharge by consultants (if involved): No    Discharge Planner Nurse   Safe discharge environment identified: Yes  Barriers to discharge: Yes       Entered by: Kelly Baptiste 05/15/2019 12:39 AM       VSS on RA. Pain controlled with oxycodone, scheduled tylenol & ibuprofen. Voltaren gel also applied to L knee. Assist x1 with walker, pt using bed pan. Pt stood at side of bed for a minute, then felt dizzy with increased pain. PT following. Will continue to monitor.     Temp: 96.6  F (35.9  C) Temp src: Oral BP: 110/56 Pulse: 54 Heart Rate: 58 Resp: 16 SpO2: 95 % O2 Device: None (Room air)         Please review provider order for any additional goals.   Nurse to notify provider when observation goals have been met and patient is ready for discharge.

## 2019-05-15 NOTE — PLAN OF CARE
PRIMARY DIAGNOSIS: ACUTE PAIN  OUTPATIENT/OBSERVATION GOALS TO BE MET BEFORE DISCHARGE:  1. Pain Status: Improved-controlled with oral pain medications.    2. Return to near baseline physical activity: No    3. Cleared for discharge by consultants (if involved): No    Discharge Planner Nurse   Safe discharge environment identified: Unknown at this time. PT consulted. TCU vs Home    Barriers to discharge: Yes       Entered by: Eliane Cornell 05/15/2019 9:07 AM   Pt alert and orientated.  speaking.  ordered. Pain to L knee 7/10. Oral pain meds and voltaren given. Up assist of 1 with a walker. PT to re-eval. Stool softner given. Bruising noted to L knee. Family at bedside. Will continue to monitor.   Please review provider order for any additional goals.   Nurse to notify provider when observation goals have been met and patient is ready for discharge.

## 2019-05-15 NOTE — PLAN OF CARE
PRIMARY DIAGNOSIS: ACUTE PAIN  OUTPATIENT/OBSERVATION GOALS TO BE MET BEFORE DISCHARGE:  1. Pain Status: Improved-controlled with oral pain medications.    2. Return to near baseline physical activity: No    3. Cleared for discharge by consultants (if involved): No    Discharge Planner Nurse   Safe discharge environment identified: Unknown at this time. TCU vs Home    Barriers to discharge: Yes       Entered by: Eliane Cornell 05/15/2019 2:01 PM   Pt alert and orientated. Pain managed with oral medications. Tolerating reg diet. Up assist of 1 with walker. Tenderness and bruising to L knee. PT to re-eval.  used. Will continue to monitor.   Please review provider order for any additional goals.   Nurse to notify provider when observation goals have been met and patient is ready for discharge.

## 2019-05-15 NOTE — PLAN OF CARE
PRIMARY DIAGNOSIS: ACUTE PAIN- Left knee pain  OUTPATIENT/OBSERVATION GOALS TO BE MET BEFORE DISCHARGE:  1. Pain Status: Improved-controlled with oral pain medications.    2. Return to near baseline physical activity: No    3. Cleared for discharge by consultants (if involved): No    Discharge Planner Nurse   Safe discharge environment identified: Yes  Barriers to discharge: Yes       Entered by: Kelly Baptiste 05/15/2019 4:18 AM       VSS. Pain controlled with oxycodone, scheduled tylenol & ibuprofen. Assist x1 with walker, pt using bed pan. PT following. Family staying overnight. Will continue to monitor.       Please review provider order for any additional goals.   Nurse to notify provider when observation goals have been met and patient is ready for discharge.

## 2019-05-16 ENCOUNTER — OFFICE VISIT (OUTPATIENT)
Dept: INTERPRETER SERVICES | Facility: CLINIC | Age: 59
End: 2019-05-16
Payer: COMMERCIAL

## 2019-05-16 VITALS
TEMPERATURE: 97.3 F | HEART RATE: 88 BPM | OXYGEN SATURATION: 95 % | SYSTOLIC BLOOD PRESSURE: 119 MMHG | DIASTOLIC BLOOD PRESSURE: 65 MMHG | RESPIRATION RATE: 16 BRPM

## 2019-05-16 LAB
ANION GAP SERPL CALCULATED.3IONS-SCNC: 5 MMOL/L (ref 3–14)
BUN SERPL-MCNC: 16 MG/DL (ref 7–30)
CALCIUM SERPL-MCNC: 8.4 MG/DL (ref 8.5–10.1)
CHLORIDE SERPL-SCNC: 108 MMOL/L (ref 94–109)
CO2 SERPL-SCNC: 29 MMOL/L (ref 20–32)
CREAT SERPL-MCNC: 0.68 MG/DL (ref 0.52–1.04)
GFR SERPL CREATININE-BSD FRML MDRD: >90 ML/MIN/{1.73_M2}
GLUCOSE SERPL-MCNC: 88 MG/DL (ref 70–99)
PLATELET # BLD AUTO: 258 10E9/L (ref 150–450)
POTASSIUM SERPL-SCNC: 3.8 MMOL/L (ref 3.4–5.3)
SODIUM SERPL-SCNC: 142 MMOL/L (ref 133–144)

## 2019-05-16 PROCEDURE — 25000132 ZZH RX MED GY IP 250 OP 250 PS 637: Performed by: PHYSICIAN ASSISTANT

## 2019-05-16 PROCEDURE — T1013 SIGN LANG/ORAL INTERPRETER: HCPCS | Mod: U3

## 2019-05-16 PROCEDURE — G0378 HOSPITAL OBSERVATION PER HR: HCPCS

## 2019-05-16 PROCEDURE — 36415 COLL VENOUS BLD VENIPUNCTURE: CPT | Performed by: HOSPITALIST

## 2019-05-16 PROCEDURE — 85049 AUTOMATED PLATELET COUNT: CPT | Performed by: HOSPITALIST

## 2019-05-16 PROCEDURE — 25800030 ZZH RX IP 258 OP 636: Performed by: PHYSICIAN ASSISTANT

## 2019-05-16 PROCEDURE — 99217 ZZC OBSERVATION CARE DISCHARGE: CPT | Performed by: PHYSICIAN ASSISTANT

## 2019-05-16 PROCEDURE — 80048 BASIC METABOLIC PNL TOTAL CA: CPT | Performed by: HOSPITALIST

## 2019-05-16 RX ORDER — LISINOPRIL 20 MG/1
20 TABLET ORAL DAILY
Status: DISCONTINUED | OUTPATIENT
Start: 2019-05-16 | End: 2019-05-16 | Stop reason: HOSPADM

## 2019-05-16 RX ORDER — AMOXICILLIN 250 MG
1-2 CAPSULE ORAL 2 TIMES DAILY PRN
Qty: 20 TABLET | Refills: 0 | Status: SHIPPED | OUTPATIENT
Start: 2019-05-16 | End: 2024-02-23

## 2019-05-16 RX ORDER — OXYCODONE HYDROCHLORIDE 5 MG/1
5-10 TABLET ORAL EVERY 6 HOURS PRN
Qty: 18 TABLET | Refills: 0 | Status: SHIPPED | OUTPATIENT
Start: 2019-05-16 | End: 2024-02-23

## 2019-05-16 RX ORDER — POLYETHYLENE GLYCOL 3350 17 G/17G
17 POWDER, FOR SOLUTION ORAL DAILY PRN
Qty: 12 PACKET | Refills: 0 | Status: SHIPPED | OUTPATIENT
Start: 2019-05-16 | End: 2024-02-23

## 2019-05-16 RX ORDER — HYDROXYZINE HYDROCHLORIDE 25 MG/1
25 TABLET, FILM COATED ORAL EVERY 6 HOURS PRN
Qty: 12 TABLET | Refills: 0 | Status: SHIPPED | OUTPATIENT
Start: 2019-05-16 | End: 2024-02-23

## 2019-05-16 RX ORDER — ACETAMINOPHEN 325 MG/1
650 TABLET ORAL EVERY 6 HOURS PRN
Qty: 30 TABLET | Refills: 0 | Status: SHIPPED | OUTPATIENT
Start: 2019-05-16

## 2019-05-16 RX ADMIN — DICLOFENAC 2 G: 10 GEL TOPICAL at 08:43

## 2019-05-16 RX ADMIN — CITALOPRAM HYDROBROMIDE 40 MG: 20 TABLET ORAL at 08:42

## 2019-05-16 RX ADMIN — POLYETHYLENE GLYCOL 3350 17 G: 17 POWDER, FOR SOLUTION ORAL at 08:50

## 2019-05-16 RX ADMIN — SODIUM CHLORIDE: 9 INJECTION, SOLUTION INTRAVENOUS at 01:21

## 2019-05-16 RX ADMIN — ACETAMINOPHEN 650 MG: 325 TABLET, FILM COATED ORAL at 10:45

## 2019-05-16 RX ADMIN — ACETAMINOPHEN 650 MG: 325 TABLET, FILM COATED ORAL at 04:15

## 2019-05-16 RX ADMIN — OXYCODONE HYDROCHLORIDE 10 MG: 5 TABLET ORAL at 08:42

## 2019-05-16 NOTE — DISCHARGE SUMMARY
Discharge Summary  Hospitalist Service    Lo Stewart MRN# [id number]   YOB: 1960 Age: 58 year old     Date of Admission:  5/13/2019  Date of Discharge:  5/16/2019  Admitting Physician: Miguel Beckman MD  Discharge Physician: Lashae Thacker PA-C  Discharging Service: Hospitalist Service     Primary Provider: [facility]  Primary Care Physician Phone Number: None         Discharge Diagnoses/Problem Oriented Hospital Course (Providers):    Lo Stewart was admitted on 5/13/2019 by Miguel Beckman MD and I would refer you to their history and physical.  Briefly, she was admitted after a mechanical fall landing on her left knee and head. She had extensive imaging in the ED including a CT head, CT C spine, X ray pelvis, X ray left knee, X ray femur, and MRI pelvis which did not show any fractures. Her left knee is swollen and bruised with significant tenderness to palpation. She also has concussive symptoms with headache, blurred vision and dizziness. All symptoms continue to improve. Her stay was complicated by acute renal insufficiency that improved with fluids and stopping NSAIDS.    Problem List:   1. Left knee hematoma s/p mechanical fall- Mechanical fall on 5/13 with significant trauma to left knee. Xray imaging of hip, femur and left knee are negative. MRI of pelvis also negative. PT assessed and patient only able to take a couple of steps due to pain so recommend rehab.  -Elevate left knee  -Ice frequently  -Schedule Tylenol and Oxycodone PRN  -Lovenox for DVT prophylaxis until ambulating  -[facility] rehab today     2. Concussion - Head trauma after mechanical fall. CT head and CT C spine negative. Now complains of headache, dizziness and blurred vision consistent with concussion. Symptoms improving daily.  -Pain meds as above  -Eye rest  -Concussion referral placed     3. DIETER  Resolved. Baseline creatinine of 0.6.  -Stop NSAIDS  -Avoid  nephrotoxins     4. HTN  Takes PTA Prinzide and Amlodipine  -Held these at discharge due to low blood pressure. May be resumed by primary     5. HLP  -Resume PTA statin                 Code Status:      Full Code        Brief Hospital Stay Summary Sent Home With Patient in AVS:        Reason for your hospital stay      You were admitted for concerns of left knee pain after a fall and head   trauma causing a concussion. She had extensive imaging in the ED including   a CT head, CT C spine, X ray pelvis, X ray left knee, X ray left, and MRI   pelvis which did not show any fractures. Her left knee is swollen and   bruised with significant tenderness to palpation. She also has concussive   symptoms with headache, blurred vision and dizziness.     She was treated supportively with multimodal pain regimen that improved   her knee pain. She was seen by PT who recommends TCU. Her concussive   symptoms are improving but referral was placed for her to follow up with   concussion center.     Her blood pressure was lower here so her Lisinopril/HCTZ was stopped and   may have to be resumed in the future. Also, her kidney function increased   after receiving NSAIDS but improved with discontinuation and fluids.                           Pending Results:        Unresulted Labs Ordered in the Past 30 Days of this Admission     No orders found from 3/14/2019 to 5/14/2019.            Discharge Instructions and Follow-Up:      Follow-up Appointments     Follow Up and recommended labs and tests      Follow up with halfway physician.  The following labs/tests are   recommended: Review of blood pressure, her Prinzide was held at discharge.    Referral made for concussion center               Discharge Disposition:      Discharged to home         Discharge Medications:        Current Discharge Medication List      START taking these medications    Details   acetaminophen (TYLENOL) 325 MG tablet Take 2 tablets (650 mg) by mouth every 6  hours as needed for mild pain  Qty: 30 tablet, Refills: 0    Associated Diagnoses: Acute pain of left knee      enoxaparin (LOVENOX) 40 MG/0.4ML syringe Inject 0.4 mLs (40 mg) Subcutaneous every 24 hours Stop once patient ambulating regularly  Qty: 10 Syringe, Refills: 0    Associated Diagnoses: Acute pain of left knee      hydrOXYzine (ATARAX) 25 MG tablet Take 1 tablet (25 mg) by mouth every 6 hours as needed for other (adjuvant pain)  Qty: 12 tablet, Refills: 0    Associated Diagnoses: Acute pain of left knee      oxyCODONE (ROXICODONE) 5 MG tablet Take 1-2 tablets (5-10 mg) by mouth every 6 hours as needed for moderate to severe pain  Qty: 18 tablet, Refills: 0    Associated Diagnoses: Acute pain of left knee      polyethylene glycol (MIRALAX/GLYCOLAX) packet Take 17 g by mouth daily as needed for constipation  Qty: 12 packet, Refills: 0    Associated Diagnoses: Acute pain of left knee      senna-docusate (SENOKOT-S/PERICOLACE) 8.6-50 MG tablet Take 1-2 tablets by mouth 2 times daily as needed for constipation  Qty: 20 tablet, Refills: 0    Associated Diagnoses: Acute pain of left knee         CONTINUE these medications which have NOT CHANGED    Details   amLODIPine (NORVASC) 10 MG tablet Take 10 mg by mouth daily      atorvastatin (LIPITOR) 40 MG tablet Take 40 mg by mouth daily      citalopram (CELEXA) 40 MG tablet Take 40 mg by mouth daily         STOP taking these medications       ibuprofen (ADVIL/MOTRIN) 200 MG tablet Comments:   Reason for Stopping:         lisinopril-hydrochlorothiazide (PRINZIDE/ZESTORETIC) 20-25 MG tablet Comments:   Reason for Stopping:         NONFORMULARY Comments:   Reason for Stopping:                 Allergies:       No Known Allergies        Consultations This Hospital Stay:      No consultations were requested during this admission         Condition and Physical on Discharge:      Discharge condition: Stable   Vitals: Blood pressure 119/66, pulse 88, temperature 97.3  F (36.3   C), temperature source Oral, resp. rate 18, SpO2 93 %, not currently breastfeeding.     Constitutional: Alert and orientated   Lungs: CTAB   Cardiovascular: RRR with no murmur   Abdomen: Bowel sounds are present with no tenderness   Skin: No rash or open sores. Bruising of left knee   Other:          Discharge Time:      Less than 30 minutes.        Image Results From This Hospital Stay (For Non-EPIC Providers):        Results for orders placed or performed during the hospital encounter of 05/13/19   CT Head w/o Contrast    Narrative    CT SCAN OF THE HEAD WITHOUT CONTRAST May 13, 2019 10:44 AM     HISTORY: Trauma, confusion.    TECHNIQUE: Axial images of the head and coronal reformations without  IV contrast material. Radiation dose for this scan was reduced using  automated exposure control, adjustment of the mA and/or kV according  to patient size, or iterative reconstruction technique.    COMPARISON: Head CT 12/27/2015.    FINDINGS: No evidence of acute intracranial hemorrhage. No mass effect  or midline shift. Nonspecific small cortical calcification in the left  parietal region is unchanged and probably due to previous infectious  or inflammatory insult. Ventricular size is within normal limits. No  abnormal extra-axial fluid collection.    The visualized portions of the sinuses and mastoids appear normal.    Right frontal scalp soft tissue injury. No underlying calvarial  fracture.      Impression    IMPRESSION: No evidence of acute intracranial hemorrhage, mass, or  herniation.    HEIDY TOLEDO MD   CT Cervical Spine w/o Contrast    Narrative    CT CERVICAL SPINE WITHOUT CONTRAST   5/13/2019 10:48 AM     HISTORY:  Trauma, pain.     TECHNIQUE: Axial images of the cervical spine were obtained without  intravenous contrast. Multiplanar reformations were performed.  Radiation dose for this scan was reduced using automated exposure  control, adjustment of the mA and/or kV according to patient size, or  iterative  reconstruction technique.     COMPARISON: None.    FINDINGS: Sagittal images demonstrate normal posterior alignment.  There is no evidence for fracture. Mild multilevel degenerative disc  and facet disease is present. There is no significant central canal  stenosis.      Impression    IMPRESSION: Mild degenerative changes. No evidence for fracture or any  posterior malalignment.    BRYAN ROQUE MD   XR Knee Left 3 Views    Narrative    XR KNEE LT 3 VW 5/13/2019 11:06 AM    HISTORY: fall, pain      Impression    IMPRESSION: Mild degenerative change. Exam otherwise negative.    CARITO HOLLAND MD   XR Pelvis 1/2 Views    Narrative    XR PELVIS ONE VIEW 5/13/2019 10:56 AM    HISTORY: fall, leg pain    COMPARISON: None    FINDINGS:  1 view AP pelvis is negative for acute fracture or dislocation. There  is an indeterminate linear approximately 1.8 cm x 0.2 cm long density  in the lower left pelvis. Possibly calcification. A similar density  noted on 12/26/2016 CT scan.      Impression    IMPRESSION: No acute abnormality.    CACHORRO MERCEDES MD   XR Femur Left 2 Views    Narrative    XR FEMUR LT 2 VW 5/13/2019 12:29 PM    HISTORY: fall, pain      Impression    IMPRESSION: Negative.    CARITO HOLLAND MD   MR Pelvis Bone wo Contrast    Narrative    MR PELVIC BONES WITHOUT CONTRAST   5/13/2019 1:13 PM    HISTORY:  Fall, pain. Evaluate for occult fracture.    TECHNIQUE:  Coronal T1 and inversion recovery, sagittal T1, and  transverse proton density and fat suppressed T2 weighted images.    FINDINGS:   Osseous and Cartilaginous Structures:  No fracture or destructive bone  lesion.  No femoral head osteonecrosis.  No significant hip  osteoarthritis or apparent chondromalacia. Mild degenerative  subchondral edema surrounding the anterior SI joints, best seen on  axial series 7 image 8.      Acetabular Labrum: Abnormal signal in the right superolateral labrum  best seen on coronal series 5 image 11 suspicious for  right  superolateral labral tear.    Common Hamstring Tendon: Intact.    Gluteal Tendon Greater Trochanteric Attachments: The gluteus medius  and minimus tendons appear within normal limits and symmetrical.    Trochanteric and Iliopsoas Bursae: No fluid collection in the  trochanteric and iliopsoas bursae.    Joint space: Possible trace right hip joint effusion.      Impression    IMPRESSION:   1. No evidence of bone marrow edema or fracture.  2. Abnormal signal right superolateral labrum suspicious for labral  tear.  3. Possible trace right hip joint effusion.   4. Mild subchondral degenerative changes superior SI joints.    ANIRUDH GROVE MD           Most Recent Lab Results In EPIC (For Non-EPIC Providers):    Most Recent 3 CBC's:  Recent Labs   Lab Test 05/16/19  0606 05/13/19  1201 09/21/17  1758 12/26/16 2058   WBC  --  8.3 8.5 6.0   HGB  --  12.4 12.4 12.6   MCV  --  94 95 93    264 291 218      Most Recent 3 BMP's:  Recent Labs   Lab Test 05/16/19  0606 05/15/19  0617 05/13/19  1201    141 142   POTASSIUM 3.8 3.9 4.0   CHLORIDE 108 106 110*   CO2 29 31 29   BUN 16 28 17   CR 0.68 1.07* 0.58   ANIONGAP 5 4 3   PEDRO 8.4* 8.5 8.4*   GLC 88 92 97     Most Recent 3 Troponin's:No lab results found.  Most Recent 3 INR's:  Recent Labs   Lab Test 12/26/16 2058   INR 1.03     Most Recent 2 LFT's:  Recent Labs   Lab Test 12/26/16 2058 03/27/16  2134   AST 22 23   ALT 21 33   ALKPHOS 106 151*   BILITOTAL 0.5 0.3     Most Recent Cholesterol Panel:  Recent Labs   Lab Test 02/03/15  1050   CHOL 180   LDL 88   HDL 52   TRIG 202*     Most Recent 6 Bacteria Isolates From Any Culture (See EPIC Reports for Culture Details):  Recent Labs   Lab Test 12/26/16  2108 03/18/15  1558   CULT <10,000 colonies/mL mixed urogenital efren Susceptibility testing not routinely   done   No Salmonella, Shigella, Campylobacter, E. coli O157, Aeromonas, or Plesiomonas   isolated.       Most Recent TSH, T4 and HgbA1c:   Recent Labs    Lab Test 03/17/15  1011   TSH 1.58   T4 1.23

## 2019-05-16 NOTE — PLAN OF CARE
Physical Therapy Discharge Summary     Reason for therapy discharge:    Discharged to transitional care facility.     Progress towards therapy goal(s). See goals on Care Plan in [facility] electronic health record for goal details.  Goals not met.  Barriers to achieving goals:   limited tolerance for therapy, only able to ambulate 15 feet due to dizziness and knee pain. Min A for transfers and bed mob, could not attempt stairs at this time.      Therapy recommendation(s):    Continued therapy is recommended.  Rationale/Recommendations:  Pt is below baseline with functional mobility and strength and would benefit from continued PT to progress skills.

## 2019-05-16 NOTE — PLAN OF CARE
PRIMARY DIAGNOSIS: ACUTE PAIN  OUTPATIENT/OBSERVATION GOALS TO BE MET BEFORE DISCHARGE:  1. Pain Status: Improved-controlled with oral pain medications.    2. Return to near baseline physical activity: Yes    3. Cleared for discharge by consultants (if involved): Yes    Discharge Planner Nurse   Safe discharge environment identified: Yes  Barriers to discharge: No       Entered by: Abraham Wellington 05/16/2019 6:36 AM

## 2019-05-16 NOTE — PLAN OF CARE
Patient's After Visit Summary was reviewed with patient and/or Family   Patient verbalized understanding of After Visit Summary, recommended follow up and was given an opportunity to ask questions.   Discharge medications sent home with patient/family: No, Sent to TCU     Discharged with transport tech--- [facility].       OBSERVATION patient END time: 11:26 AM

## 2019-05-16 NOTE — PLAN OF CARE
PRIMARY DIAGNOSIS: ACUTE PAIN  OUTPATIENT/OBSERVATION GOALS TO BE MET BEFORE DISCHARGE:  1. Pain Status: Improved with use of oral pain meds and ice    2. Return to near baseline physical activity: No    3. Cleared for discharge by consultants (if involved): N/A    Discharge Planner Nurse   Safe discharge environment identified: Yes  Barriers to discharge: No       Entered by: Stephanie Hollis 05/15/2019 7:29 PM     Please review provider order for any additional goals.   Nurse to notify provider when observation goals have been met and patient is ready for discharge.    VSS, A/O. Assistx1. Ls-clear. Miralax given, has not had BM since 5/13. Scheduled tylenol for pain, atarax and percocet prn. Pt to dischage to [facility] via HE w/c  transport.

## 2019-05-16 NOTE — PLAN OF CARE
PRIMARY DIAGNOSIS: ACUTE PAIN  OUTPATIENT/OBSERVATION GOALS TO BE MET BEFORE DISCHARGE:  1. Pain Status: Improved with use of oral pain meds and ice    2. Return to near baseline physical activity: No    3. Cleared for discharge by consultants (if involved): N/A    Pt is A&Ox4. VSS. Temp: 97.6  F (36.4  C) Temp src: Oral BP: 122/59   Heart Rate: 67 Resp: 18 SpO2: 96 % O2 Device: None (Room air)   Pt does report minimal pain at 4-5/10. Pt ambulated to bathroom, and pain increased to 9/10. Pt wanted to shower, however, due to increased pain, pt refused and will wait until she gets to TCU tomorrow. Pt is Hungarian speaking. Family able to interpret. Plan for TCU tomorrow. [facility] to transport and needs to be set up. Ax2 with ambulation, Ax1 with pivots. Atarax, oxy, flexeril, scheduled tylenol and voltaren gel. Oxy given 10mg. Lovenox. Regular diet. IVF. Will continue to monitor.     Discharge Planner Nurse   Safe discharge environment identified: Yes  Barriers to discharge: No       Entered by: Sole Pfeiffer 05/15/2019 10:39 PM     Please review provider order for any additional goals.   Nurse to notify provider when observation goals have been met and patient is ready for discharge.

## 2019-05-16 NOTE — PLAN OF CARE
PRIMARY DIAGNOSIS: ACUTE PAIN  OUTPATIENT/OBSERVATION GOALS TO BE MET BEFORE DISCHARGE:  1. Pain Status: Improved-controlled with oral pain medications.    2. Return to near baseline physical activity: No, Assist of 1-2 with a walker.     3. Cleared for discharge by consultants (if involved): Yes    Discharge Planner Nurse   Safe discharge environment identified: Yes-- [facility] TCU   Barriers to discharge: No       Entered by: Eliane Cornell 05/16/2019 8:54 AM   Pt alert and orientated. VSS. Family at bedside. Pain 8/10 to L knee. Pain meds given. Tolerating PO meds and diet. Up Assist of 1-2. Pain increased while up. Pt icing and elevating frequently. Will discharge to TCU today via HE. Will continue to monitor.   Please review provider order for any additional goals.   Nurse to notify provider when observation goals have been met and patient is ready for discharge.

## 2019-05-16 NOTE — PROGRESS NOTES
Discharge Planner   Discharge Plans in progress: [facility] TCU, shared room.   Barriers to discharge plan: None anticipated--pending medical stability..   Follow up plan: HE WC transport arranged for 11:30am to [facility] today. Orders to be faxed. SW to continue following.        Entered by: Maureen Rios 05/16/2019 8:30 AM

## 2019-05-16 NOTE — PLAN OF CARE
PRIMARY DIAGNOSIS: ACUTE PAIN  OUTPATIENT/OBSERVATION GOALS TO BE MET BEFORE DISCHARGE:  1. Pain Status: Improved-controlled with oral pain medications.    2. Return to near baseline physical activity: Yes    3. Cleared for discharge by consultants (if involved): Yes    Discharge Planner Nurse   Safe discharge environment identified: Yes  Barriers to discharge: No       Entered by: Abraham Wellington 05/16/2019 6:37 AM

## 2019-05-29 ENCOUNTER — MEDICAL CORRESPONDENCE (OUTPATIENT)
Dept: HEALTH INFORMATION MANAGEMENT | Facility: CLINIC | Age: 59
End: 2019-05-29

## 2019-06-11 ENCOUNTER — TRANSFERRED RECORDS (OUTPATIENT)
Dept: HEALTH INFORMATION MANAGEMENT | Facility: CLINIC | Age: 59
End: 2019-06-11

## 2019-06-19 ENCOUNTER — PRE VISIT (OUTPATIENT)
Dept: CARDIOLOGY | Facility: CLINIC | Age: 59
End: 2019-06-19

## 2019-07-12 ENCOUNTER — MEDICAL CORRESPONDENCE (OUTPATIENT)
Dept: HEALTH INFORMATION MANAGEMENT | Facility: CLINIC | Age: 59
End: 2019-07-12

## 2019-07-16 ENCOUNTER — TELEPHONE (OUTPATIENT)
Dept: SURGERY | Facility: CLINIC | Age: 59
End: 2019-07-16

## 2019-07-20 ENCOUNTER — HOSPITAL ENCOUNTER (EMERGENCY)
Facility: CLINIC | Age: 59
Discharge: HOME | End: 2019-07-20
Attending: EMERGENCY MEDICINE | Admitting: EMERGENCY MEDICINE
Payer: COMMERCIAL

## 2019-07-20 VITALS
BODY MASS INDEX: 38.41 KG/M2 | OXYGEN SATURATION: 99 % | DIASTOLIC BLOOD PRESSURE: 85 MMHG | WEIGHT: 210 LBS | TEMPERATURE: 98.1 F | HEART RATE: 71 BPM | SYSTOLIC BLOOD PRESSURE: 130 MMHG

## 2019-07-20 DIAGNOSIS — N30.01 ACUTE CYSTITIS WITH HEMATURIA: ICD-10-CM

## 2019-07-20 LAB
ALBUMIN UR-MCNC: ABNORMAL MG/DL
APPEARANCE UR: ABNORMAL
BACTERIA #/AREA URNS HPF: ABNORMAL /HPF
BILIRUB UR QL STRIP: ABNORMAL
COLOR UR AUTO: ABNORMAL
GLUCOSE UR STRIP-MCNC: ABNORMAL MG/DL
HGB UR QL STRIP: ABNORMAL
KETONES UR STRIP-MCNC: ABNORMAL MG/DL
LEUKOCYTE ESTERASE UR QL STRIP: ABNORMAL
MUCOUS THREADS #/AREA URNS LPF: PRESENT /LPF
NITRATE UR QL: ABNORMAL
PH UR STRIP: ABNORMAL PH (ref 5–7)
RBC #/AREA URNS AUTO: >182 /HPF (ref 0–2)
SOURCE: ABNORMAL
SP GR UR STRIP: ABNORMAL (ref 1–1.03)
SQUAMOUS #/AREA URNS AUTO: 4 /HPF (ref 0–1)
UROBILINOGEN UR STRIP-MCNC: ABNORMAL MG/DL (ref 0–2)
WBC #/AREA URNS AUTO: >182 /HPF (ref 0–5)
WBC CLUMPS #/AREA URNS HPF: PRESENT /HPF

## 2019-07-20 PROCEDURE — 25000132 ZZH RX MED GY IP 250 OP 250 PS 637: Performed by: EMERGENCY MEDICINE

## 2019-07-20 PROCEDURE — 81001 URINALYSIS AUTO W/SCOPE: CPT | Performed by: EMERGENCY MEDICINE

## 2019-07-20 PROCEDURE — 99283 EMERGENCY DEPT VISIT LOW MDM: CPT

## 2019-07-20 RX ORDER — IBUPROFEN 800 MG/1
800 TABLET, FILM COATED ORAL ONCE
Status: COMPLETED | OUTPATIENT
Start: 2019-07-20 | End: 2019-07-20

## 2019-07-20 RX ORDER — PHENAZOPYRIDINE HYDROCHLORIDE 200 MG/1
200 TABLET, FILM COATED ORAL 3 TIMES DAILY
Qty: 9 TABLET | Refills: 0 | Status: SHIPPED | OUTPATIENT
Start: 2019-07-20 | End: 2019-07-23

## 2019-07-20 RX ORDER — CEPHALEXIN 500 MG/1
500 CAPSULE ORAL 2 TIMES DAILY
Qty: 28 CAPSULE | Refills: 0 | Status: SHIPPED | OUTPATIENT
Start: 2019-07-20 | End: 2019-07-27

## 2019-07-20 RX ADMIN — IBUPROFEN 800 MG: 800 TABLET ORAL at 15:31

## 2019-07-20 ASSESSMENT — ENCOUNTER SYMPTOMS
CHILLS: 1
DYSURIA: 1
FEVER: 0
FLANK PAIN: 0
ABDOMINAL PAIN: 0
HEMATURIA: 1
VOMITING: 0

## 2019-07-20 NOTE — ED TRIAGE NOTES
"Woke up this morning with \"a lot of burning when I was urinating\".  Has noticed some blood in urine. Burning started this morning.  Post-menopausal.  Denies fever, has been chilled.   "

## 2019-07-20 NOTE — ED AVS SNAPSHOT
[facility] Emergency Department  [address]  [facility] [address]  Phone:  [phone]  Fax:  [phone]                                    Lo Stewart   MRN: [id number]    Department:  [facility] Emergency Department   Date of Visit:  7/20/2019           After Visit Summary Signature Page    I have received my discharge instructions, and my questions have been answered. I have discussed any challenges I see with this plan with the nurse or doctor.    ..........................................................................................................................................  Patient/Patient Representative Signature      ..........................................................................................................................................  Patient Representative Print Name and Relationship to Patient    ..................................................               ................................................  Date                                   Time    ..........................................................................................................................................  Reviewed by Signature/Title    ...................................................              ..............................................  Date                                               Time          22EPIC Rev 08/18

## 2019-07-20 NOTE — ED PROVIDER NOTES
History     Chief Complaint:  urinary symptoms      HPI   Lo Stewart is a 59 year old female with a history of UTI in October of 2018 who presents with dysuria. The patient has been experiencing dysuria and chills with some hematuria since she woke up this morning. Patient also has pelvic pain. She denies fevers, vomiting, flank pain, or abdominal pain. Of note, patient mentions she had left over Pyridium from prior UTI to help with her pain.     Allergies:  No known drug allergies     Medications:    Amlodipine  Lipitor  Celexa  Lovenox  Atarax  Roxicodone  Senna-ducosate    Past Medical History:     Depression  Obesity  Hypertension  Arthritis  UTI    Past Surgical History:    Ovarian cyst removal  Lumpectomy  Rotator cuff repair right  Right knee surgical repair.     Family History:    Diabetes  Hypertension    Social History:  Smoking status: Never smoker  Alcohol use: Yes  Marital Status:  Single [1]       Review of Systems   Constitutional: Positive for chills. Negative for fever.   Gastrointestinal: Negative for abdominal pain and vomiting.   Genitourinary: Positive for dysuria, hematuria and pelvic pain. Negative for flank pain.   All other systems reviewed and are negative.    Physical Exam     Patient Vitals for the past 24 hrs:   BP Temp Temp src Pulse Heart Rate SpO2 Weight   07/20/19 1557 -- -- -- -- -- 99 % --   07/20/19 1556 130/85 -- -- 71 -- -- --   07/20/19 1433 149/80 -- -- -- -- -- --   07/20/19 1430 -- 98.1  F (36.7  C) Oral -- 84 96 % 95.3 kg (210 lb)       Physical Exam  General: Patient is alert and cooperative.  Uncomfortable.   HENT:  Normal nose, oropharynx. Moist oral mucosa.  Eyes: EOMI. Normal conjunctiva.  Neck:  Normal range of motion and appearance.   Cardiovascular:  Normal rate.  Pulmonary/Chest:  Effort normal.   Abdominal: Suprapubic discomfort.    : no CVA tenderness.   Musculoskeletal: Normal range of motion. No edema or tenderness.   Neurological: oriented, normal  strength, sensation, and coordination.   Skin: Warm and dry. No rash or bruising.   Psychiatric: Normal mood and affect. Normal behavior and judgement.      Emergency Department Course     Laboratory:  UA: WBC >182, RBC >182, WBC clumps present, Bacteria moderate, Squamous Epithelial 4, Mucous present, Glucose, bilirubin, Ketones, Blood, protein albumin, Nitrite, and Leukocyte esterase unable to assess due to interfering substance.      Interventions:  1525: Ibuprofen 800 mg PO     Emergency Department Course:  Past medical records, nursing notes, and vitals reviewed.  1516: I performed an exam of the patient and obtained history, as documented above.    1611: I rechecked the patient.  Findings and plan explained to the Patient. Patient discharged home with instructions regarding supportive care, medications, and reasons to return. The importance of close follow-up was reviewed.     Impression & Plan    Medical Decision Making:  Afebrile and generally healthy 59-year-old female has presented with acute urinary symptoms and suprapubic discomfort consistent with acute cystitis.  She has had no fever, vomiting, or flank pain to suggest pyelonephritis.  Testing was limited to a urinalysis, this was limited as she did take Pyridium before arrival.  However it does demonstrate pyuria, hematuria, and bacteria.  I have added a culture and prescribed a 7-day course of Keflex.  Also prescribed Pyridium and recommend concurrent scheduled ibuprofen use for the next couple days.  Return if worse.  Information on urinary tract infections provided.  Follow-up with clinic next week if not resolving.    Diagnosis:    ICD-10-CM    1. Acute cystitis with hematuria N30.01        Disposition:  discharged to home    Discharge Medications:     Medication List      Started    cephALEXin 500 MG capsule  Commonly known as:  KEFLEX  500 mg, Oral, 2 TIMES DAILY     phenazopyridine 200 MG tablet  Commonly known as:  PYRIDIUM  200 mg, Oral, 3  TIMES DAILY              Barney Drummond  7/20/2019   [facility] EMERGENCY DEPARTMENT    Scribe Disclosure:  I, Barney Drummond, am serving as a scribe at 3:16 PM on 7/20/2019 to document services personally performed by Aman Aragon MD based on my observations and the provider's statements to me.          Aman Aragon MD  07/21/19 1118

## 2019-07-25 ENCOUNTER — OFFICE VISIT (OUTPATIENT)
Dept: CARDIOLOGY | Facility: CLINIC | Age: 59
End: 2019-07-25
Payer: COMMERCIAL

## 2019-07-25 ENCOUNTER — HOSPITAL ENCOUNTER (OUTPATIENT)
Dept: CARDIOLOGY | Facility: CLINIC | Age: 59
Discharge: HOME | End: 2019-07-25
Attending: INTERNAL MEDICINE | Admitting: INTERNAL MEDICINE
Payer: COMMERCIAL

## 2019-07-25 VITALS
HEIGHT: 62 IN | HEART RATE: 80 BPM | BODY MASS INDEX: 39.58 KG/M2 | DIASTOLIC BLOOD PRESSURE: 74 MMHG | WEIGHT: 215.1 LBS | SYSTOLIC BLOOD PRESSURE: 114 MMHG

## 2019-07-25 DIAGNOSIS — R42 DIZZINESS: ICD-10-CM

## 2019-07-25 DIAGNOSIS — Z13.6 SCREENING FOR CARDIOVASCULAR CONDITION: ICD-10-CM

## 2019-07-25 DIAGNOSIS — R00.1 BRADYCARDIA: ICD-10-CM

## 2019-07-25 DIAGNOSIS — Z13.6 SCREENING FOR CARDIOVASCULAR CONDITION: Primary | ICD-10-CM

## 2019-07-25 PROCEDURE — 0298T ZIO PATCH HOLTER ADULT PEDIATRIC GREATER THAN 48 HRS: CPT | Performed by: INTERNAL MEDICINE

## 2019-07-25 PROCEDURE — 0296T ZIO PATCH HOLTER ADULT PEDIATRIC GREATER THAN 48 HRS: CPT

## 2019-07-25 PROCEDURE — 93000 ELECTROCARDIOGRAM COMPLETE: CPT | Performed by: INTERNAL MEDICINE

## 2019-07-25 PROCEDURE — 99214 OFFICE O/P EST MOD 30 MIN: CPT | Performed by: INTERNAL MEDICINE

## 2019-07-25 RX ORDER — LISINOPRIL 10 MG/1
10 TABLET ORAL DAILY
Status: ON HOLD | COMMUNITY
End: 2024-03-19

## 2019-07-25 ASSESSMENT — MIFFLIN-ST. JEOR: SCORE: 1503.94

## 2019-07-25 NOTE — LETTER
7[place]25/2019    [facility] [place]  [address] [facility] [address]    RE: Lo Stewart       Dear Colleague,    I had the pleasure of seeing Lo Stewart in the [facility].    HPI and Plan:   Delightful 59-year-old  lady who has been referred by [facility] services for evaluation of bradycardia.  Tension that this lady has a history of syncope though she denies ever having any blackouts.    He tells me that for the past year she has been feeling transiently dizzy.  When she sits down the symptom will resolve after a minute or 2.  Occasionally it is related to her exertion such as when she does her housework and when she goes down some stairs.  She also feels that her stamina has been decreasing recently.  As mentioned above no history of syncope.  She denies exertional chest discomfort or unusual shortness of breath.  She has no PND orthopnea or ankle swelling.  She was admitted to [facility] briefly after mechanical fall.  I asked her about that and she tells me that she tripped and fell.  EKG shows sinus bradycardia though not significantly bradycardic as her heart rate is in the 50s.  She denies palpitations.    There is a long history of hypertension but not diabetes.  She does not smoke drink or abuse drugs.  Body mass index is 39.3.    She tells me that the reason she was referred because her primary care provider thinks she might need a stress test.  I think that would certainly be appropriate in that her dizziness is exertional and she feels her energy has been lacking a little recently.  Unfortunately she has significant arthritis and I would not be able to get her to exercise adequately on a treadmill.  I will request a pharmacologic stress study instead.    I suspect that her sinus bradycardia may be physiological.  It probably has very little to do with her dizziness which I think may be related to her physical  deconditioning.  However I would certainly like to exclude the small possibility that she has significant bradyarrhythmias and I will request a Ziopatch.  Follow-up will be arranged once I have the chance to review the results of these tests.    Orders Placed This Encounter   Procedures     NM Lexiscan stress test (nuc card)     EKG 12-lead complete w/read - Clinics (performed today)     Zio Patch Holter Adult Pediatric Greater than 48 hrs       Orders Placed This Encounter   Medications     lisinopril (PRINIVIL/ZESTRIL) 10 MG tablet     Sig: Take 10 mg by mouth daily       Encounter Diagnoses   Name Primary?     Screening for cardiovascular condition Yes     Dizziness      Bradycardia        CURRENT MEDICATIONS:  Current Outpatient Medications   Medication Sig Dispense Refill     acetaminophen (TYLENOL) 325 MG tablet Take 2 tablets (650 mg) by mouth every 6 hours as needed for mild pain 30 tablet 0     atorvastatin (LIPITOR) 40 MG tablet Take 40 mg by mouth daily       citalopram (CELEXA) 40 MG tablet Take 40 mg by mouth daily       lisinopril (PRINIVIL/ZESTRIL) 10 MG tablet Take 10 mg by mouth daily       amLODIPine (NORVASC) 10 MG tablet Take 10 mg by mouth daily       cephALEXin (KEFLEX) 500 MG capsule Take 1 capsule (500 mg) by mouth 2 times daily for 7 days (Patient not taking: Reported on 7/25/2019) 28 capsule 0     enoxaparin (LOVENOX) 40 MG/0.4ML syringe Inject 0.4 mLs (40 mg) Subcutaneous every 24 hours Stop once patient ambulating regularly (Patient not taking: Reported on 7/25/2019) 10 Syringe 0     hydrOXYzine (ATARAX) 25 MG tablet Take 1 tablet (25 mg) by mouth every 6 hours as needed for other (adjuvant pain) (Patient not taking: Reported on 7/25/2019) 12 tablet 0     oxyCODONE (ROXICODONE) 5 MG tablet Take 1-2 tablets (5-10 mg) by mouth every 6 hours as needed for moderate to severe pain (Patient not taking: Reported on 7/25/2019) 18 tablet 0     polyethylene glycol (MIRALAX/GLYCOLAX) packet Take 17  g by mouth daily as needed for constipation (Patient not taking: Reported on 2019) 12 packet 0     senna-docusate (SENOKOT-S/PERICOLACE) 8.6-50 MG tablet Take 1-2 tablets by mouth 2 times daily as needed for constipation (Patient not taking: Reported on 2019) 20 tablet 0       ALLERGIES   No Known Allergies    PAST MEDICAL HISTORY:  Past Medical History:   Diagnosis Date     Arthritis      HTN, goal below 140/90      Knee pain, bilateral      Major depression      Syncope        PAST SURGICAL HISTORY:  Past Surgical History:   Procedure Laterality Date     ABDOMEN SURGERY      ovarian cyst removed     COLONOSCOPY N/A 2015    Procedure: COLONOSCOPY;  Surgeon: Avel New MD;  Location:  GI     LEG SURGERY Right     cyst removed     LUMPECTOMY BREAST Right      ORTHOPEDIC SURGERY      rotator cuff repair right shoulder in 2015     ORTHOPEDIC SURGERY Right 3/2/2016    right knee surgical repair       FAMILY HISTORY:  Family History   Problem Relation Age of Onset     Diabetes Mother          74yo     Hypertension Father         born 1934     Depression/Anxiety Sister         and osteoporosis age 35     Hypertension Sister      Other - See Comments Brother          murder 19yo     Other - See Comments Sister          2-3 months old     Other - See Comments Sister          2-3 months old     Family History Negative Brother         6 living brothers healthy     Family History Negative Daughter      Family History Negative Son         2 boys     Diabetes Son 38       SOCIAL HISTORY:  Social History     Socioeconomic History     Marital status: Single     Spouse name: None     Number of children: None     Years of education: None     Highest education level: None   Occupational History     None   Social Needs     Financial resource strain: None     Food insecurity:     Worry: None     Inability: None     Transportation needs:     Medical: None     Non-medical:  "None   Tobacco Use     Smoking status: Never Smoker     Smokeless tobacco: Never Used   Substance and Sexual Activity     Alcohol use: Yes     Comment: rare     Drug use: No     Sexual activity: Never   Lifestyle     Physical activity:     Days per week: None     Minutes per session: None     Stress: None   Relationships     Social connections:     Talks on phone: None     Gets together: None     Attends Hoahaoism service: None     Active member of club or organization: None     Attends meetings of clubs or organizations: None     Relationship status: None     Intimate partner violence:     Fear of current or ex partner: None     Emotionally abused: None     Physically abused: None     Forced sexual activity: None   Other Topics Concern     Parent/sibling w/ CABG, MI or angioplasty before 65F 55M? Not Asked   Social History Narrative    ** Merged History Encounter **            Review of Systems:  Skin:  Negative     Eyes:  Positive for glasses  ENT:  Negative    Respiratory:  Positive for shortness of breath;cough  Cardiovascular:    Positive for;palpitations;lightheadedness  Gastroenterology: not assessed    Genitourinary:  not assessed    Musculoskeletal:  Positive for joint pain  Neurologic:  Positive for numbness or tingling of hands  Psychiatric:  not assessed    Heme/Lymph/Imm:  Positive for chills  Endocrine:  Negative      Physical Exam:  Vitals: /74   Pulse 80   Ht 1.575 m (5' 2\")   Wt 97.6 kg (215 lb 1.6 oz)   BMI 39.34 kg/m       Constitutional:  cooperative, alert and oriented, well developed, well nourished, in no acute distress obese      Skin:  warm and dry to the touch, no apparent skin lesions or masses noted          Head:  normocephalic, no masses or lesions        Eyes:  pupils equal and round, conjunctivae and lids unremarkable, sclera white, no xanthalasma, EOMS intact, no nystagmus        Lymph:No Cervical lymphadenopathy present     ENT:  no pallor or cyanosis, dentition good    "     Neck:  carotid pulses are full and equal bilaterally, JVP normal, no carotid bruit        Respiratory:  normal breath sounds, clear to auscultation, normal A-P diameter, normal symmetry, normal respiratory excursion, no use of accessory muscles         Cardiac: regular rhythm, normal S1/S2, no S3 or S4, apical impulse not displaced, no murmurs, gallops or rubs                pulses full and equal, no bruits auscultated                                        GI:  abdomen soft, non-tender, BS normoactive, no mass, no HSM, no bruits        Extremities and Muscular Skeletal:  no deformities, clubbing, cyanosis, erythema observed              Neurological:  no gross motor deficits        Psych:  Alert and Oriented x 3        Recent Lab Results:  LIPID RESULTS:  Lab Results   Component Value Date    CHOL 180 02/03/2015    HDL 52 02/03/2015    LDL 88 02/03/2015    TRIG 202 (H) 02/03/2015    CHOLHDLRATIO 3.5 02/03/2015       LIVER ENZYME RESULTS:  Lab Results   Component Value Date    AST 22 12/26/2016    ALT 21 12/26/2016       CBC RESULTS:  Lab Results   Component Value Date    WBC 8.3 05/13/2019    RBC 4.11 05/13/2019    HGB 12.4 05/13/2019    HCT 38.8 05/13/2019    MCV 94 05/13/2019    MCH 30.2 05/13/2019    MCHC 32.0 05/13/2019    RDW 13.1 05/13/2019     05/16/2019       BMP RESULTS:  Lab Results   Component Value Date     05/16/2019    POTASSIUM 3.8 05/16/2019    CHLORIDE 108 05/16/2019    CO2 29 05/16/2019    ANIONGAP 5 05/16/2019    GLC 88 05/16/2019    BUN 16 05/16/2019    CR 0.68 05/16/2019    GFRESTIMATED >90 05/16/2019    GFRESTBLACK >90 05/16/2019    PEDRO 8.4 (L) 05/16/2019        A1C RESULTS:  No results found for: A1C    INR RESULTS:  Lab Results   Component Value Date    INR 1.03 12/26/2016           CC  No referring provider defined for this encounter.              Thank you for allowing me to participate in the care of your patient.    Sincerely,     DR SERGEI NGO MD     [facility]  [facility]

## 2019-07-25 NOTE — PROGRESS NOTES
HPI and Plan:   Delightful 59-year-old  lady who has been referred by [place] services for evaluation of bradycardia.  Tension that this lady has a history of syncope though she denies ever having any blackouts.    He tells me that for the past year she has been feeling transiently dizzy.  When she sits down the symptom will resolve after a minute or 2.  Occasionally it is related to her exertion such as when she does her housework and when she goes down some stairs.  She also feels that her stamina has been decreasing recently.  As mentioned above no history of syncope.  She denies exertional chest discomfort or unusual shortness of breath.  She has no PND orthopnea or ankle swelling.  She was admitted to [facility] briefly after mechanical fall.  I asked her about that and she tells me that she tripped and fell.  EKG shows sinus bradycardia though not significantly bradycardic as her heart rate is in the 50s.  She denies palpitations.    There is a long history of hypertension but not diabetes.  She does not smoke drink or abuse drugs.  Body mass index is 39.3.    She tells me that the reason she was referred because her primary care provider thinks she might need a stress test.  I think that would certainly be appropriate in that her dizziness is exertional and she feels her energy has been lacking a little recently.  Unfortunately she has significant arthritis and I would not be able to get her to exercise adequately on a treadmill.  I will request a pharmacologic stress study instead.    I suspect that her sinus bradycardia may be physiological.  It probably has very little to do with her dizziness which I think may be related to her physical deconditioning.  However I would certainly like to exclude the small possibility that she has significant bradyarrhythmias and I will request a Ziopatch.  Follow-up will be arranged once I have the chance to review the results of these  tests.    Orders Placed This Encounter   Procedures     NM Lexiscan stress test (nuc card)     EKG 12-lead complete w/read - Clinics (performed today)     Zio Patch Holter Adult Pediatric Greater than 48 hrs       Orders Placed This Encounter   Medications     lisinopril (PRINIVIL/ZESTRIL) 10 MG tablet     Sig: Take 10 mg by mouth daily       Encounter Diagnoses   Name Primary?     Screening for cardiovascular condition Yes     Dizziness      Bradycardia        CURRENT MEDICATIONS:  Current Outpatient Medications   Medication Sig Dispense Refill     acetaminophen (TYLENOL) 325 MG tablet Take 2 tablets (650 mg) by mouth every 6 hours as needed for mild pain 30 tablet 0     atorvastatin (LIPITOR) 40 MG tablet Take 40 mg by mouth daily       citalopram (CELEXA) 40 MG tablet Take 40 mg by mouth daily       lisinopril (PRINIVIL/ZESTRIL) 10 MG tablet Take 10 mg by mouth daily       amLODIPine (NORVASC) 10 MG tablet Take 10 mg by mouth daily       cephALEXin (KEFLEX) 500 MG capsule Take 1 capsule (500 mg) by mouth 2 times daily for 7 days (Patient not taking: Reported on 7/25/2019) 28 capsule 0     enoxaparin (LOVENOX) 40 MG/0.4ML syringe Inject 0.4 mLs (40 mg) Subcutaneous every 24 hours Stop once patient ambulating regularly (Patient not taking: Reported on 7/25/2019) 10 Syringe 0     hydrOXYzine (ATARAX) 25 MG tablet Take 1 tablet (25 mg) by mouth every 6 hours as needed for other (adjuvant pain) (Patient not taking: Reported on 7/25/2019) 12 tablet 0     oxyCODONE (ROXICODONE) 5 MG tablet Take 1-2 tablets (5-10 mg) by mouth every 6 hours as needed for moderate to severe pain (Patient not taking: Reported on 7/25/2019) 18 tablet 0     polyethylene glycol (MIRALAX/GLYCOLAX) packet Take 17 g by mouth daily as needed for constipation (Patient not taking: Reported on 7/25/2019) 12 packet 0     senna-docusate (SENOKOT-S/PERICOLACE) 8.6-50 MG tablet Take 1-2 tablets by mouth 2 times daily as needed for constipation (Patient  not taking: Reported on 2019) 20 tablet 0       ALLERGIES   No Known Allergies    PAST MEDICAL HISTORY:  Past Medical History:   Diagnosis Date     Arthritis      HTN, goal below 140/90      Knee pain, bilateral      Major depression      Syncope        PAST SURGICAL HISTORY:  Past Surgical History:   Procedure Laterality Date     ABDOMEN SURGERY      ovarian cyst removed     COLONOSCOPY N/A 2015    Procedure: COLONOSCOPY;  Surgeon: Avel New MD;  Location:  GI     LEG SURGERY Right     cyst removed     LUMPECTOMY BREAST Right      ORTHOPEDIC SURGERY      rotator cuff repair right shoulder in 2015     ORTHOPEDIC SURGERY Right 3/2/2016    right knee surgical repair       FAMILY HISTORY:  Family History   Problem Relation Age of Onset     Diabetes Mother          74yo     Hypertension Father         born 1934     Depression/Anxiety Sister         and osteoporosis age 35     Hypertension Sister      Other - See Comments Brother          murder 19yo     Other - See Comments Sister          2-3 months old     Other - See Comments Sister          2-3 months old     Family History Negative Brother         6 living brothers healthy     Family History Negative Daughter      Family History Negative Son         2 boys     Diabetes Son 38       SOCIAL HISTORY:  Social History     Socioeconomic History     Marital status: Single     Spouse name: None     Number of children: None     Years of education: None     Highest education level: None   Occupational History     None   Social Needs     Financial resource strain: None     Food insecurity:     Worry: None     Inability: None     Transportation needs:     Medical: None     Non-medical: None   Tobacco Use     Smoking status: Never Smoker     Smokeless tobacco: Never Used   Substance and Sexual Activity     Alcohol use: Yes     Comment: rare     Drug use: No     Sexual activity: Never   Lifestyle     Physical activity:  "    Days per week: None     Minutes per session: None     Stress: None   Relationships     Social connections:     Talks on phone: None     Gets together: None     Attends Scientologist service: None     Active member of club or organization: None     Attends meetings of clubs or organizations: None     Relationship status: None     Intimate partner violence:     Fear of current or ex partner: None     Emotionally abused: None     Physically abused: None     Forced sexual activity: None   Other Topics Concern     Parent/sibling w/ CABG, MI or angioplasty before 65F 55M? Not Asked   Social History Narrative    ** Merged History Encounter **            Review of Systems:  Skin:  Negative     Eyes:  Positive for glasses  ENT:  Negative    Respiratory:  Positive for shortness of breath;cough  Cardiovascular:    Positive for;palpitations;lightheadedness  Gastroenterology: not assessed    Genitourinary:  not assessed    Musculoskeletal:  Positive for joint pain  Neurologic:  Positive for numbness or tingling of hands  Psychiatric:  not assessed    Heme/Lymph/Imm:  Positive for chills  Endocrine:  Negative      Physical Exam:  Vitals: /74   Pulse 80   Ht 1.575 m (5' 2\")   Wt 97.6 kg (215 lb 1.6 oz)   BMI 39.34 kg/m      Constitutional:  cooperative, alert and oriented, well developed, well nourished, in no acute distress obese      Skin:  warm and dry to the touch, no apparent skin lesions or masses noted          Head:  normocephalic, no masses or lesions        Eyes:  pupils equal and round, conjunctivae and lids unremarkable, sclera white, no xanthalasma, EOMS intact, no nystagmus        Lymph:No Cervical lymphadenopathy present     ENT:  no pallor or cyanosis, dentition good        Neck:  carotid pulses are full and equal bilaterally, JVP normal, no carotid bruit        Respiratory:  normal breath sounds, clear to auscultation, normal A-P diameter, normal symmetry, normal respiratory excursion, no use of " accessory muscles         Cardiac: regular rhythm, normal S1/S2, no S3 or S4, apical impulse not displaced, no murmurs, gallops or rubs                pulses full and equal, no bruits auscultated                                        GI:  abdomen soft, non-tender, BS normoactive, no mass, no HSM, no bruits        Extremities and Muscular Skeletal:  no deformities, clubbing, cyanosis, erythema observed              Neurological:  no gross motor deficits        Psych:  Alert and Oriented x 3        Recent Lab Results:  LIPID RESULTS:  Lab Results   Component Value Date    CHOL 180 02/03/2015    HDL 52 02/03/2015    LDL 88 02/03/2015    TRIG 202 (H) 02/03/2015    CHOLHDLRATIO 3.5 02/03/2015       LIVER ENZYME RESULTS:  Lab Results   Component Value Date    AST 22 12/26/2016    ALT 21 12/26/2016       CBC RESULTS:  Lab Results   Component Value Date    WBC 8.3 05/13/2019    RBC 4.11 05/13/2019    HGB 12.4 05/13/2019    HCT 38.8 05/13/2019    MCV 94 05/13/2019    MCH 30.2 05/13/2019    MCHC 32.0 05/13/2019    RDW 13.1 05/13/2019     05/16/2019       BMP RESULTS:  Lab Results   Component Value Date     05/16/2019    POTASSIUM 3.8 05/16/2019    CHLORIDE 108 05/16/2019    CO2 29 05/16/2019    ANIONGAP 5 05/16/2019    GLC 88 05/16/2019    BUN 16 05/16/2019    CR 0.68 05/16/2019    GFRESTIMATED >90 05/16/2019    GFRESTBLACK >90 05/16/2019    PEDRO 8.4 (L) 05/16/2019        A1C RESULTS:  No results found for: A1C    INR RESULTS:  Lab Results   Component Value Date    INR 1.03 12/26/2016           CC  No referring provider defined for this encounter.

## 2019-07-29 ENCOUNTER — HOSPITAL ENCOUNTER (OUTPATIENT)
Dept: NUCLEAR MEDICINE | Facility: CLINIC | Age: 59
Setting detail: NUCLEAR MEDICINE
End: 2019-07-29
Attending: INTERNAL MEDICINE
Payer: COMMERCIAL

## 2019-07-29 ENCOUNTER — HOSPITAL ENCOUNTER (OUTPATIENT)
Dept: CARDIOLOGY | Facility: CLINIC | Age: 59
Discharge: HOME | End: 2019-07-29
Attending: INTERNAL MEDICINE | Admitting: INTERNAL MEDICINE
Payer: COMMERCIAL

## 2019-07-29 DIAGNOSIS — Z13.6 SCREENING FOR CARDIOVASCULAR CONDITION: ICD-10-CM

## 2019-07-29 DIAGNOSIS — R00.1 BRADYCARDIA: ICD-10-CM

## 2019-07-29 DIAGNOSIS — R42 DIZZINESS: ICD-10-CM

## 2019-07-29 PROCEDURE — 34300033 ZZH RX 343: Performed by: INTERNAL MEDICINE

## 2019-07-29 PROCEDURE — 93018 CV STRESS TEST I&R ONLY: CPT | Performed by: INTERNAL MEDICINE

## 2019-07-29 PROCEDURE — 25000128 H RX IP 250 OP 636: Performed by: INTERNAL MEDICINE

## 2019-07-29 PROCEDURE — 93017 CV STRESS TEST TRACING ONLY: CPT

## 2019-07-29 PROCEDURE — A9502 TC99M TETROFOSMIN: HCPCS | Performed by: INTERNAL MEDICINE

## 2019-07-29 PROCEDURE — 93016 CV STRESS TEST SUPVJ ONLY: CPT | Performed by: INTERNAL MEDICINE

## 2019-07-29 PROCEDURE — 78452 HT MUSCLE IMAGE SPECT MULT: CPT | Mod: 26 | Performed by: INTERNAL MEDICINE

## 2019-07-29 PROCEDURE — 78452 HT MUSCLE IMAGE SPECT MULT: CPT

## 2019-07-29 RX ORDER — REGADENOSON 0.08 MG/ML
0.4 INJECTION, SOLUTION INTRAVENOUS ONCE
Status: COMPLETED | OUTPATIENT
Start: 2019-07-29 | End: 2019-07-29

## 2019-07-29 RX ADMIN — REGADENOSON 0.4 MG: 0.08 INJECTION, SOLUTION INTRAVENOUS at 12:28

## 2019-07-29 RX ADMIN — TETROFOSMIN 32.1 MCI.: 1.38 INJECTION, POWDER, LYOPHILIZED, FOR SOLUTION INTRAVENOUS at 12:29

## 2019-07-29 RX ADMIN — TETROFOSMIN 11 MCI.: 1.38 INJECTION, POWDER, LYOPHILIZED, FOR SOLUTION INTRAVENOUS at 11:01

## 2019-07-29 NOTE — PROGRESS NOTES
Pre-procedure:  Are you having any pain or shortness of breath (prior to starting)? denies  Initial vital signs: /77, HR 61, RR 18  Allergies reviewed: yes   Rhythm: Sinus  Medications taken within 48 hours of procedure: N/A  Any nitrates within the last 48 hours:denies  Last Caffeine: yesterday  Lung sounds: CTA, no wheezing, crackles or rtx  Health History (COPD, Asthma, etc): denies           Procedure: Lexiscan  Reaction/symptoms after receiving Katya injection: Shortness of breath  Intensity of Pain:   Rhythm: sinus  1. Vital Signs:/70, HR 83, RR 18  2. Vital Signs:/72, HR 85, RR 18    Reversal agent: N/A    Post:   Resolution of symptoms?: YES  Vital signs: /69, HR 76, RR 18  Vital signs: HR 75, RR 16  Rhythm: sinus  Walk: NO  Comment:   Return to Radiology

## 2020-12-15 ENCOUNTER — APPOINTMENT (OUTPATIENT)
Dept: CT IMAGING | Facility: CLINIC | Age: 60
End: 2020-12-15
Attending: INTERNAL MEDICINE
Payer: COMMERCIAL

## 2020-12-15 ENCOUNTER — HOSPITAL ENCOUNTER (EMERGENCY)
Facility: CLINIC | Age: 60
Discharge: HOME | End: 2020-12-15
Attending: INTERNAL MEDICINE | Admitting: INTERNAL MEDICINE
Payer: COMMERCIAL

## 2020-12-15 ENCOUNTER — ALLIED HEALTH/NURSE VISIT (OUTPATIENT)
Dept: INTERPRETER SERVICES | Facility: CLINIC | Age: 60
End: 2020-12-15

## 2020-12-15 VITALS
SYSTOLIC BLOOD PRESSURE: 139 MMHG | OXYGEN SATURATION: 96 % | HEART RATE: 54 BPM | TEMPERATURE: 99 F | WEIGHT: 215 LBS | RESPIRATION RATE: 16 BRPM | DIASTOLIC BLOOD PRESSURE: 77 MMHG | BODY MASS INDEX: 38.09 KG/M2 | HEIGHT: 63 IN

## 2020-12-15 DIAGNOSIS — R10.13 EPIGASTRIC PAIN: ICD-10-CM

## 2020-12-15 DIAGNOSIS — J12.82 PNEUMONIA DUE TO 2019 NOVEL CORONAVIRUS: ICD-10-CM

## 2020-12-15 DIAGNOSIS — U07.1 PNEUMONIA DUE TO 2019 NOVEL CORONAVIRUS: ICD-10-CM

## 2020-12-15 LAB
ALBUMIN SERPL-MCNC: 3 G/DL (ref 3.4–5)
ALBUMIN UR-MCNC: 20 MG/DL
ALP SERPL-CCNC: 116 U/L (ref 40–150)
ALT SERPL W P-5'-P-CCNC: 22 U/L (ref 0–50)
ANION GAP SERPL CALCULATED.3IONS-SCNC: 5 MMOL/L (ref 3–14)
APPEARANCE UR: ABNORMAL
AST SERPL W P-5'-P-CCNC: 23 U/L (ref 0–45)
BASOPHILS # BLD AUTO: 0 10E9/L (ref 0–0.2)
BASOPHILS NFR BLD AUTO: 0.5 %
BILIRUB SERPL-MCNC: 0.7 MG/DL (ref 0.2–1.3)
BILIRUB UR QL STRIP: NEGATIVE
BUN SERPL-MCNC: 19 MG/DL (ref 7–30)
CALCIUM SERPL-MCNC: 9.3 MG/DL (ref 8.5–10.1)
CHLORIDE SERPL-SCNC: 105 MMOL/L (ref 94–109)
CO2 SERPL-SCNC: 30 MMOL/L (ref 20–32)
COLOR UR AUTO: YELLOW
CREAT SERPL-MCNC: 0.77 MG/DL (ref 0.52–1.04)
DIFFERENTIAL METHOD BLD: ABNORMAL
EOSINOPHIL # BLD AUTO: 0.1 10E9/L (ref 0–0.7)
EOSINOPHIL NFR BLD AUTO: 2.2 %
ERYTHROCYTE [DISTWIDTH] IN BLOOD BY AUTOMATED COUNT: 12.3 % (ref 10–15)
GFR SERPL CREATININE-BSD FRML MDRD: 84 ML/MIN/{1.73_M2}
GLUCOSE SERPL-MCNC: 110 MG/DL (ref 70–99)
GLUCOSE UR STRIP-MCNC: NEGATIVE MG/DL
GRAN CASTS #/AREA URNS LPF: 2 /LPF
HCT VFR BLD AUTO: 42.1 % (ref 35–47)
HGB BLD-MCNC: 13.5 G/DL (ref 11.7–15.7)
HGB UR QL STRIP: ABNORMAL
HYALINE CASTS #/AREA URNS LPF: 1 /LPF (ref 0–2)
IMM GRANULOCYTES # BLD: 0 10E9/L (ref 0–0.4)
IMM GRANULOCYTES NFR BLD: 0.7 %
KETONES UR STRIP-MCNC: NEGATIVE MG/DL
LEUKOCYTE ESTERASE UR QL STRIP: NEGATIVE
LIPASE SERPL-CCNC: 168 U/L (ref 73–393)
LYMPHOCYTES # BLD AUTO: 1.8 10E9/L (ref 0.8–5.3)
LYMPHOCYTES NFR BLD AUTO: 29.9 %
MCH RBC QN AUTO: 30.3 PG (ref 26.5–33)
MCHC RBC AUTO-ENTMCNC: 32.1 G/DL (ref 31.5–36.5)
MCV RBC AUTO: 94 FL (ref 78–100)
MONOCYTES # BLD AUTO: 0.7 10E9/L (ref 0–1.3)
MONOCYTES NFR BLD AUTO: 12.2 %
MUCOUS THREADS #/AREA URNS LPF: PRESENT /LPF
NEUTROPHILS # BLD AUTO: 3.3 10E9/L (ref 1.6–8.3)
NEUTROPHILS NFR BLD AUTO: 54.5 %
NITRATE UR QL: NEGATIVE
NRBC # BLD AUTO: 0 10*3/UL
NRBC BLD AUTO-RTO: 0 /100
PH UR STRIP: 5.5 PH (ref 5–7)
PLATELET # BLD AUTO: 554 10E9/L (ref 150–450)
POTASSIUM SERPL-SCNC: 3.1 MMOL/L (ref 3.4–5.3)
PROT SERPL-MCNC: 8 G/DL (ref 6.8–8.8)
RBC # BLD AUTO: 4.46 10E12/L (ref 3.8–5.2)
RBC #/AREA URNS AUTO: 108 /HPF (ref 0–2)
SODIUM SERPL-SCNC: 140 MMOL/L (ref 133–144)
SOURCE: ABNORMAL
SP GR UR STRIP: 1.02 (ref 1–1.03)
SQUAMOUS #/AREA URNS AUTO: 1 /HPF (ref 0–1)
TROPONIN I SERPL-MCNC: <0.015 UG/L (ref 0–0.04)
UROBILINOGEN UR STRIP-MCNC: NORMAL MG/DL (ref 0–2)
WBC # BLD AUTO: 6 10E9/L (ref 4–11)
WBC #/AREA URNS AUTO: 8 /HPF (ref 0–5)

## 2020-12-15 PROCEDURE — 250N000011 HC RX IP 250 OP 636: Performed by: INTERNAL MEDICINE

## 2020-12-15 PROCEDURE — 258N000003 HC RX IP 258 OP 636: Performed by: INTERNAL MEDICINE

## 2020-12-15 PROCEDURE — 81001 URINALYSIS AUTO W/SCOPE: CPT | Performed by: INTERNAL MEDICINE

## 2020-12-15 PROCEDURE — T1013 SIGN LANG/ORAL INTERPRETER: HCPCS | Mod: U3,TEL

## 2020-12-15 PROCEDURE — 80053 COMPREHEN METABOLIC PANEL: CPT | Performed by: INTERNAL MEDICINE

## 2020-12-15 PROCEDURE — 83690 ASSAY OF LIPASE: CPT | Performed by: INTERNAL MEDICINE

## 2020-12-15 PROCEDURE — 96374 THER/PROPH/DIAG INJ IV PUSH: CPT | Mod: 59

## 2020-12-15 PROCEDURE — 250N000009 HC RX 250: Performed by: INTERNAL MEDICINE

## 2020-12-15 PROCEDURE — 96361 HYDRATE IV INFUSION ADD-ON: CPT

## 2020-12-15 PROCEDURE — 93005 ELECTROCARDIOGRAM TRACING: CPT

## 2020-12-15 PROCEDURE — 250N000013 HC RX MED GY IP 250 OP 250 PS 637: Performed by: INTERNAL MEDICINE

## 2020-12-15 PROCEDURE — 85025 COMPLETE CBC W/AUTO DIFF WBC: CPT | Performed by: INTERNAL MEDICINE

## 2020-12-15 PROCEDURE — 99285 EMERGENCY DEPT VISIT HI MDM: CPT | Mod: 25

## 2020-12-15 PROCEDURE — 74177 CT ABD & PELVIS W/CONTRAST: CPT

## 2020-12-15 PROCEDURE — 84484 ASSAY OF TROPONIN QUANT: CPT | Performed by: INTERNAL MEDICINE

## 2020-12-15 RX ORDER — PANTOPRAZOLE SODIUM 40 MG/1
40 TABLET, DELAYED RELEASE ORAL DAILY
Qty: 30 TABLET | Refills: 0 | Status: SHIPPED | OUTPATIENT
Start: 2020-12-15 | End: 2021-01-14

## 2020-12-15 RX ORDER — MAGNESIUM HYDROXIDE/ALUMINUM HYDROXICE/SIMETHICONE 120; 1200; 1200 MG/30ML; MG/30ML; MG/30ML
30 SUSPENSION ORAL ONCE
Status: COMPLETED | OUTPATIENT
Start: 2020-12-15 | End: 2020-12-15

## 2020-12-15 RX ORDER — ONDANSETRON 4 MG/1
4 TABLET, ORALLY DISINTEGRATING ORAL EVERY 8 HOURS PRN
Qty: 10 TABLET | Refills: 0 | Status: SHIPPED | OUTPATIENT
Start: 2020-12-15 | End: 2020-12-18

## 2020-12-15 RX ORDER — ONDANSETRON 2 MG/ML
4 INJECTION INTRAMUSCULAR; INTRAVENOUS EVERY 30 MIN PRN
Status: DISCONTINUED | OUTPATIENT
Start: 2020-12-15 | End: 2020-12-15 | Stop reason: HOSPADM

## 2020-12-15 RX ORDER — SODIUM CHLORIDE 9 MG/ML
INJECTION, SOLUTION INTRAVENOUS CONTINUOUS
Status: DISCONTINUED | OUTPATIENT
Start: 2020-12-15 | End: 2020-12-15 | Stop reason: HOSPADM

## 2020-12-15 RX ORDER — IOPAMIDOL 755 MG/ML
500 INJECTION, SOLUTION INTRAVASCULAR ONCE
Status: COMPLETED | OUTPATIENT
Start: 2020-12-15 | End: 2020-12-15

## 2020-12-15 RX ADMIN — ONDANSETRON 4 MG: 2 INJECTION INTRAMUSCULAR; INTRAVENOUS at 12:57

## 2020-12-15 RX ADMIN — ALUMINUM HYDROXIDE, MAGNESIUM HYDROXIDE, AND SIMETHICONE 30 ML: 200; 200; 20 SUSPENSION ORAL at 12:57

## 2020-12-15 RX ADMIN — SODIUM CHLORIDE 65 ML: 9 INJECTION, SOLUTION INTRAVENOUS at 14:43

## 2020-12-15 RX ADMIN — IOPAMIDOL 100 ML: 755 INJECTION, SOLUTION INTRAVENOUS at 14:43

## 2020-12-15 RX ADMIN — SODIUM CHLORIDE 1000 ML: 9 INJECTION, SOLUTION INTRAVENOUS at 12:57

## 2020-12-15 ASSESSMENT — ENCOUNTER SYMPTOMS
FEVER: 0
WEAKNESS: 1
DIZZINESS: 1
VOMITING: 1
ABDOMINAL PAIN: 1
DIARRHEA: 1
NAUSEA: 1
COUGH: 1

## 2020-12-15 ASSESSMENT — MIFFLIN-ST. JEOR: SCORE: 1514.36

## 2020-12-15 NOTE — ED AVS SNAPSHOT
[facility] Emergency Dept  [address]  [facility] [address]  Phone: [phone]  Fax: [phone]                                    Lo Stewart   MRN: [id number]    Department: [facility] Emergency Dept   Date of Visit: 12/15/2020           After Visit Summary Signature Page    I have received my discharge instructions, and my questions have been answered. I have discussed any challenges I see with this plan with the nurse or doctor.    ..........................................................................................................................................  Patient/Patient Representative Signature      ..........................................................................................................................................  Patient Representative Print Name and Relationship to Patient    ..................................................               ................................................  Date                                   Time    ..........................................................................................................................................  Reviewed by Signature/Title    ...................................................              ..............................................  Date                                               Time          22EPIC Rev 08/18

## 2020-12-15 NOTE — ED TRIAGE NOTES
Pt has COVID  2 weeks ago.  She is now having difficulty eating with nausea.  No vomiting.  She has diarrhea.  No blood in diarrhea.

## 2020-12-15 NOTE — ED PROVIDER NOTES
"History   Chief Complaint:  Nausea, Vomiting, & Diarrhea and Abdominal Pain     The history is limited by a language barrier. A  was used.      Lo Stewart is a 60 year old female with history of hypertension who presents with nausea, vomiting, diarrhea, and abdominal pain. The patient reports 2 weeks ago she tested positive for COVID and last week she developed epigastric abdominal pain with associated nausea, vomiting, and poor oral intake. She has not taken any medications for the pain but does take NyQuil for her cough which has improved. She endorses diarrhea which has resolved. She denies any known fever. In the Ed, she states she feels weak, dizzy, as though she cannot walk.    Allergies:  No known drug allergies     Medications:  Amlodipine   Atorvastatin   Celexa   Lovenox  Atarax   Lisinopril   Polyethylene glycol   Senna-docusate     Past Medical History:    Arthritis   Hypertension   Major depression  Primary osteoarthritis of both knees  Vitamin D deficiency      Past Surgical History:    Ovarian cyst removed  Leg surgery  Lumpectomy breast   Rotator cuff repair right shoulder  Right knee surgical repair      Family History:    Diabetes   Hypertension   Depression  Anxiety  Osteoporosis     Social History:  Patient presents unaccompanied to the ED.     Review of Systems   Constitutional: Negative for fever.   Respiratory: Positive for cough.    Gastrointestinal: Positive for abdominal pain, diarrhea, nausea and vomiting.   Neurological: Positive for dizziness and weakness.   All other systems reviewed and are negative.      Physical Exam     Patient Vitals for the past 24 hrs:   BP Temp Temp src Pulse Resp SpO2 Height Weight   12/15/20 1223 -- -- -- -- -- -- 1.6 m (5' 3\") 97.5 kg (215 lb)   12/15/20 1222 125/73 99  F (37.2  C) Oral 64 22 97 % -- --       Physical Exam  Constitutional:       Comments: Pleasant and cooperative   HENT:      Right Ear: Tympanic membrane normal. "      Left Ear: Tympanic membrane normal.      Mouth/Throat:      Pharynx: No posterior oropharyngeal erythema.   Eyes:      Conjunctiva/sclera: Conjunctivae normal.   Neck:      Musculoskeletal: Neck supple.   Cardiovascular:      Rate and Rhythm: Normal rate and regular rhythm.      Heart sounds: Normal heart sounds.   Pulmonary:      Effort: Pulmonary effort is normal.      Breath sounds: Normal breath sounds.   Abdominal:      General: Bowel sounds are normal. There is no distension.      Palpations: Abdomen is soft.      Tenderness: There is abdominal tenderness in the epigastric area. There is no guarding or rebound.   Musculoskeletal: Normal range of motion.   Skin:     General: Skin is warm and dry.   Neurological:      Mental Status: She is alert.         Emergency Department Course     ECG (13:16:46):  Rate 48 bpm. VT interval 160. QRS duration 100. QT/QTc 462/412. P-R-T axes 29 0 -2. Sinus bradycardia. Nonspecific T wave abnormality. Abnormal ECG. Interpreted at 1325 by Yessy Mandujano MD.     Imaging:  CT Abdomen/Pelvis w Contrast:  1.  In the visualized portions of the lung bases, groundglass and   patchy airspace opacities are compatible with known pneumonia.   2.  No acute findings in the abdomen and pelvis.   As read by Radiology.     Laboratory:  CBC:  (H) o/w WNL (WBC 6.0, HGB 13.5)  CMP: Glucose 110 (H), Potassium: 3.1 (L), o/w WNL (Creatinine: 0.77)    Troponin (Collected 1305): <0.015   Lipase: 168     UA with Microscopic: blood - large, Protein Albumin 20, WBC 8 (H),  (H), mucous - present, granular casts 2, o/w negative.        Emergency Department Course:  Reviewed:  1230: I reviewed the patient's nursing notes, vitals, past medical records, and Care Everywhere.     Assessments:  1234: I performed an exam of the patient as documented above.     1600: I rechecked the patient and discussed their ED workup.        Interventions:  1257 NS 1L IV Bolus     1257 Zofran 4 mg IV      1257 GI Cocktail - Maalox 15 mL, Viscous Lidocaine 15 mL, 30 mL suspension PO       Impression & Plan     Covid-19  Lo Stewart was evaluated during a global COVID-19 pandemic, which necessitated consideration that the patient might be at risk for infection with the SARS-CoV-2 virus that causes COVID-19.   Applicable protocols for evaluation were followed during the patient's care.   COVID-19 was considered as part of the patient's evaluation. The plan for testing is:  a test was obtained at a previous visit and reviewed & considered today.    Medical Decision Making:    Lo Stewart is a 60 year old female about 2 weeks out from a positive Covid test who presents with nausea, dizziness, and fatigue, as well as epigastric pain.  Laboratory tests did not suggest gallbladder disease or pancreatitis.  She did have blood in the urine which I thought could indicate a renal etiology of symptoms.  Interestingly she reported 3 days of gross hematuria when she first had Covid.  CT of the abdomen does not show any evidence of kidney stone or obvious kidney infection.  It does show bibasilar lung infiltrates consistent with Covid pneumonia.  Patient is not hypoxic.  Although nauseated her electrolytes are preserved and renal function is good.  After fluids and antiemetics she is feeling improved and able to eat a sandwich.  She did note improvement in symptoms after a GI cocktail.  I will discharge her with Zofran and a proton pump inhibitor.    Diagnosis:    ICD-10-CM    1. Epigastric pain  R10.13    2. Pneumonia due to 2019 novel coronavirus  U07.1     J12.89         Discharge Medications:  New Prescriptions    No medications on file     Scribe Disclosure:  I, Jaylene Elena, am serving as a scribe at 12:30 PM on 12/15/2020 to document services personally performed by Yessy Mandujano MD based on my observations and the provider's statements to me.      Yessy Mandujano MD  12/15/20 [id number]

## 2020-12-15 NOTE — ED NOTES
Ambulation trial complete. Patient tolerated well, stated she felt very tired, moved slowly. O2 between 98%-100%, pulse between 60-64.

## 2020-12-16 LAB — INTERPRETATION ECG - MUSE: NORMAL

## 2021-06-01 VITALS — WEIGHT: 209 LBS | HEIGHT: 63 IN | BODY MASS INDEX: 37.03 KG/M2

## 2021-06-18 NOTE — PROGRESS NOTES
[facility] Vein Consult      Assessment:     1. varicose veins, bilateral   2. spider veins, bilateral     Plan:     1. Treatment options of conservative therapy of stockings use, exercise, weight loss,  elevating legs when possible.    2. Script for compression stockings 20-30 mm hg  3. Ultrasound to evaluate legs for incompetency of both deep and superficial system .   4. Surgical treatment   Discussed options briefly   Risks and benefits of surgical intervention including infection, burns, dvt,  thrombophlebitis, not closing, recurrence, numbness and nerve injury and need  for further intervention were all discused  5. Referral to lymphedema care  6. Follow up: prn .   7. Call for any questions concerns or issues    Subjective:      Lo Zayas is a 57 y.o. female  who was referred by Marilyn Durbin CNP  for evaluation of varicose veins. Symptoms include pain, aching, fatigue, burning, edema and dermatitis. Patient has history of leg selling, pain and vein issues that have progressed. Pain and symptoms have affected daily living and work activities needing medications. Here for evaluation today. Stocking use with compression stockings of 20-30 mm hg or greater for a month    Allergies:Review of patient's allergies indicates no known allergies.    Past Medical History:   Diagnosis Date     Depression      High cholesterol      Hypertension      Leg swelling        Past Surgical History:   Procedure Laterality Date     OOPHORECTOMY      cyst removed       Current Outpatient Prescriptions   Medication Sig     atorvastatin (LIPITOR) 40 MG tablet Take 40 mg by mouth daily.     cholecalciferol, vitamin D3, 1,000 unit tablet Take 2,000 Units by mouth daily.     citalopram (CELEXA) 40 MG tablet Take 40 mg by mouth daily.     glucosamine-chondroitin 500-400 mg cap Take 1 capsule by mouth 3 (three) times a day.     lisinopril-hydrochlorothiazide (PRINZIDE,ZESTORETIC) 20-25 mg per tablet Take 1 tablet by mouth  "daily.     omega-3/dha/epa/fish oil (FISH OIL-OMEGA-3 FATTY ACIDS) 300-1,000 mg capsule Take 2 g by mouth 2 (two) times a day.       Family History   Problem Relation Age of Onset     Hypertension Mother      Hypertension Father         reports that she has never smoked. She has never used smokeless tobacco.      Review of Systems  Pertinent items are noted in HPI.  A 12 point comprehensive review of systems was negative except as noted.      Objective:     Vitals:    05/30/18 1021   BP: 138/68   Patient Site: Left Arm   Patient Position: Sitting   Pulse: 76   Resp: 18   Temp: 98.7  F (37.1  C)   TempSrc: Oral   Weight: 209 lb (94.8 kg)   Height: 5' 3\" (1.6 m)     Body mass index is 37.02 kg/(m^2).    EXAM:  GENERAL: This is a well-developed 57 y.o. female who appears her stated age  HEAD: normocephalic  HEENT: Pupils equal and reactive bilaterally  MOUTH: mucus membranes intact. Normal dentation  CARDIAC: RRR without murmur  CHEST/LUNG:  Clear to auscultation bilaterally  ABDOMEN: Soft, nontender, nondistended, no masses noted   NEUROLOGIC: Focally intact, nonfocal, alert and oriented x 3  INTEGUMENT: No open lesions or ulcers  VASCULAR: Pulses intact, symmetrical upper and lower extremities. There areskin changes consistent with chronic venous insufficiency. Varicose veins present in bilateral greater saphenous distribution. Spider veins present bilateral.    Imaging:    US Venous Insufficiency Legs Bilateral (Order [id number])   Imaging   Date: 5/30/2018 Department: [facility] Vascular [facility] Released By: Kosta Griffin RDMS, RVT Authorizing: Brandon Zaman MD   Study Result   BILATERAL LOWER EXTREMITY VENOUS DUPLEX ULTRASOUND WITH INSUFFICIENCY STUDY  5/30/2018 11:14 AM     INDICATIONS: Symptomatic varicose veins. Leg pain and swelling.     TECHNIQUE: Venous duplex ultrasound with gray-scale images, graded compression, and color-flow, Doppler, and spectral analysis. Abnormal reflux is " defined as 600 msec for superficial veins, 350 msec for perforating veins, and 1 sec for deep veins.  COMPARISONS: None.     FINDINGS: No significant abnormal reflux is detected in the right or left greater or lesser saphenous veins. No significant deep venous reflux is detected, and no incompetent perforating veins are identified.     There is no evidence for deep vein thrombosis. There is normal flow and compressibility in the femoral, popliteal, and posterior tibial veins. Right and left common femoral veins demonstrate similar waveforms.     CONCLUSIONS:   1.  No significant abnormal venous reflux detected.  2.  No evidence for lower extremity deep venous thrombosis.         Brandon Zaman MD  [facility] Surgery Dept.

## 2021-06-18 NOTE — PROGRESS NOTES
Left post knee VV. Bilateral swelling. Sart compression  See Dr Flores.. US done and reviewed results.

## 2021-11-24 ENCOUNTER — HOSPITAL ENCOUNTER (EMERGENCY)
Facility: CLINIC | Age: 61
Discharge: HOME | End: 2021-11-24
Attending: EMERGENCY MEDICINE | Admitting: EMERGENCY MEDICINE
Payer: COMMERCIAL

## 2021-11-24 ENCOUNTER — APPOINTMENT (OUTPATIENT)
Dept: ULTRASOUND IMAGING | Facility: CLINIC | Age: 61
End: 2021-11-24
Attending: EMERGENCY MEDICINE
Payer: COMMERCIAL

## 2021-11-24 VITALS
HEART RATE: 66 BPM | RESPIRATION RATE: 16 BRPM | SYSTOLIC BLOOD PRESSURE: 120 MMHG | DIASTOLIC BLOOD PRESSURE: 58 MMHG | OXYGEN SATURATION: 96 % | TEMPERATURE: 98.7 F

## 2021-11-24 DIAGNOSIS — R10.13 ABDOMINAL PAIN, EPIGASTRIC: ICD-10-CM

## 2021-11-24 LAB
ALBUMIN SERPL-MCNC: 3.4 G/DL (ref 3.4–5)
ALP SERPL-CCNC: 123 U/L (ref 40–150)
ALT SERPL W P-5'-P-CCNC: 29 U/L (ref 0–50)
ANION GAP SERPL CALCULATED.3IONS-SCNC: 6 MMOL/L (ref 3–14)
AST SERPL W P-5'-P-CCNC: 21 U/L (ref 0–45)
BILIRUB SERPL-MCNC: 0.5 MG/DL (ref 0.2–1.3)
BUN SERPL-MCNC: 19 MG/DL (ref 7–30)
CALCIUM SERPL-MCNC: 9.5 MG/DL (ref 8.5–10.1)
CHLORIDE BLD-SCNC: 103 MMOL/L (ref 94–109)
CO2 SERPL-SCNC: 28 MMOL/L (ref 20–32)
CREAT SERPL-MCNC: 0.61 MG/DL (ref 0.52–1.04)
ERYTHROCYTE [DISTWIDTH] IN BLOOD BY AUTOMATED COUNT: 13.2 % (ref 10–15)
GFR SERPL CREATININE-BSD FRML MDRD: >90 ML/MIN/1.73M2
GLUCOSE BLD-MCNC: 94 MG/DL (ref 70–99)
HCT VFR BLD AUTO: 46.7 % (ref 35–47)
HGB BLD-MCNC: 14.3 G/DL (ref 11.7–15.7)
HOLD SPECIMEN: NORMAL
LIPASE SERPL-CCNC: 109 U/L (ref 73–393)
MCH RBC QN AUTO: 29.8 PG (ref 26.5–33)
MCHC RBC AUTO-ENTMCNC: 30.6 G/DL (ref 31.5–36.5)
MCV RBC AUTO: 97 FL (ref 78–100)
PLATELET # BLD AUTO: 330 10E3/UL (ref 150–450)
POTASSIUM BLD-SCNC: 3.6 MMOL/L (ref 3.4–5.3)
PROT SERPL-MCNC: 8.3 G/DL (ref 6.8–8.8)
RBC # BLD AUTO: 4.8 10E6/UL (ref 3.8–5.2)
SODIUM SERPL-SCNC: 137 MMOL/L (ref 133–144)
TROPONIN I SERPL HS-MCNC: 7 NG/L
TROPONIN I SERPL-MCNC: <0.015 UG/L (ref 0–0.04)
WBC # BLD AUTO: 8.4 10E3/UL (ref 4–11)

## 2021-11-24 PROCEDURE — 82040 ASSAY OF SERUM ALBUMIN: CPT | Performed by: EMERGENCY MEDICINE

## 2021-11-24 PROCEDURE — 250N000009 HC RX 250: Performed by: EMERGENCY MEDICINE

## 2021-11-24 PROCEDURE — 99285 EMERGENCY DEPT VISIT HI MDM: CPT | Mod: 25

## 2021-11-24 PROCEDURE — 83690 ASSAY OF LIPASE: CPT | Performed by: EMERGENCY MEDICINE

## 2021-11-24 PROCEDURE — 82247 BILIRUBIN TOTAL: CPT | Performed by: EMERGENCY MEDICINE

## 2021-11-24 PROCEDURE — 85027 COMPLETE CBC AUTOMATED: CPT | Performed by: EMERGENCY MEDICINE

## 2021-11-24 PROCEDURE — 250N000013 HC RX MED GY IP 250 OP 250 PS 637: Performed by: EMERGENCY MEDICINE

## 2021-11-24 PROCEDURE — 93005 ELECTROCARDIOGRAM TRACING: CPT

## 2021-11-24 PROCEDURE — 76705 ECHO EXAM OF ABDOMEN: CPT

## 2021-11-24 PROCEDURE — 36415 COLL VENOUS BLD VENIPUNCTURE: CPT | Performed by: EMERGENCY MEDICINE

## 2021-11-24 PROCEDURE — 84484 ASSAY OF TROPONIN QUANT: CPT | Performed by: EMERGENCY MEDICINE

## 2021-11-24 RX ADMIN — OMEPRAZOLE 20 MG: 20 CAPSULE, DELAYED RELEASE ORAL at 22:34

## 2021-11-24 RX ADMIN — LIDOCAINE HYDROCHLORIDE 30 ML: 20 SOLUTION ORAL; TOPICAL at 19:15

## 2021-11-24 ASSESSMENT — ENCOUNTER SYMPTOMS
FEVER: 0
DIARRHEA: 1
ABDOMINAL PAIN: 1
VOMITING: 0
NAUSEA: 1

## 2021-11-24 NOTE — ED TRIAGE NOTES
[place] for 3 days, abdominal pain for 2 days. Pt also having sia aches and feeling lightheaded. Pt has been keeping food down. Pt states that she had some heart palpitations earlier today, but they resolved. ABC Intact.

## 2021-11-24 NOTE — ED PROVIDER NOTES
History   Chief Complaint:  Abdominal pain     The history is limited by a language barrier (Korean). A  was used.      Lo Stewart is a 61 year old female with history of hypertension who presents with abdominal pain and associated nausea and dizziness beginning two days ago. She says there is pain immediately when she starts eating. Patient states before the pain started she had diarrhea for two days, although the diarrhea has since alleviated. She denies fever and vomiting. She denies a history of cholecystectomy ofr appendectomy. She denies a history of heart burn.     Review of Systems   Constitutional: Negative for fever.   Gastrointestinal: Positive for abdominal pain, diarrhea and nausea. Negative for vomiting.   All other systems reviewed and are negative.    Allergies:  The patient has no known allergies.    Medications:  Glucosamine-chondroitin  Lisinopril-hydrochlorothiazide  Amlodipine  Atorvastatin  Citalopram  Enoxaparin  Hydroxyzine    Past Medical History:     Arthritis   Hypertension   Major depression    Primary osteoarthritis of both knees  Obesity  Acromioclavicular arthrosis  Lumbago  Prediabetes  Patellofemoral pain syndrome    Past Surgical History:    Oophorectomy- cyst removal  Lumpectomy - right breast  Rotator cuff repair - right shoulder  Knee repair- right knee     Family History:    Mother: Diabetes, Hypertension  Father: Hypertension  Sister: Depression, Anxiety, Osteoporosis, Hypertension  Son: Diabetes    Social History:  Patient arrived to ED by car.  She presents alone.    Physical Exam     Patient Vitals for the past 24 hrs:   BP Temp Temp src Pulse Resp SpO2   11/24/21 2210 120/58 -- -- 66 -- 96 %   11/24/21 1900 117/67 -- -- 59 -- 100 %   11/24/21 1810 (!) 88/69 -- -- 63 -- 100 %   11/24/21 1620 126/79 98.7  F (37.1  C) Temporal 63 16 100 %       Physical Exam  Nursing note and vitals reviewed.    HENT:   Mouth/Throat: Moist mucous membranes.    Eyes: EOMI, nonicteric sclera  Cardiovascular: Normal rate, regular rhythm, no murmurs, rubs, or gallops  Pulmonary/Chest: Effort normal and breath sounds normal. No respiratory distress. No wheezes. No rales.   Abdominal: Soft. Epigastric TTP, nondistended, no guarding or rigidity.   Musculoskeletal: Normal range of motion.   Neurological: Alert. Moves all extremities spontaneously.   Skin: Skin is warm and dry. No rash noted.   Psychiatric: Normal mood and affect.       Emergency Department Course   ECG  ECG obtained at 1629, ECG read at 1814  Sinus bradycardia  Nonspecific ST and T wave abnormality  Abnormal ECG   Rate 59 bpm. IL interval 152 ms. QRS duration 90 ms. QT/QTc 442/437 ms. P-R-T axes 36 7 1.     Imaging:  Abdomen US, limited (RUQ only)   Final Result   IMPRESSION:   1.  Normal limited abdominal ultrasound.                Laboratory:  Labs Ordered and Resulted from Time of ED Arrival to Time of ED Departure   CBC WITH PLATELETS - Abnormal       Result Value    WBC Count 8.4      RBC Count 4.80      Hemoglobin 14.3      Hematocrit 46.7      MCV 97      MCH 29.8      MCHC 30.6 (*)     RDW 13.2      Platelet Count 330     LIPASE - Normal    Lipase 109     COMPREHENSIVE METABOLIC PANEL - Normal    Sodium 137      Potassium 3.6      Chloride 103      Carbon Dioxide (CO2) 28      Anion Gap 6      Urea Nitrogen 19      Creatinine 0.61      Calcium 9.5      Glucose 94      Alkaline Phosphatase 123      AST 21      ALT 29      Protein Total 8.3      Albumin 3.4      Bilirubin Total 0.5      GFR Estimate >90     TROPONIN I - Normal    Troponin I High Sensitivity 7     TROPONIN I - Normal    Troponin I <0.015          Emergency Department Course:  Reviewed:  I reviewed nursing notes, vitals, past medical history and Care Everywhere    Assessments:  1810 I obtained history and examined the patient as noted above.   2200 I rechecked the patient and explained findings.     Interventions:  1915 Lidocaine, alum &  mag hydroxide-simethicone 30 mL PO    Disposition:  The patient was discharged to home.     Impression & Plan     CMS Diagnoses: None    Medical Decision Making:  Lo Stewart is a 61 year old female who presents for evaluation of epigastric abdominal pain.  I considered a broad differential diagnosis for this patient including gastritis, GERD, cholecystitis, choledocholithiasis, biliary colic, pancreatitis, colonic or small bowel pathology, vascular etiologies including aneurysm, ulcer.  Signs and symptoms here are consistent with gastritis vs GERD.  Patient experienced relief here with GI cocktail. Labs and US negative for pancreatitis, choledocholithiasis, cholecystitis, cholangitis, etc. There are no signs of any serious etiologies as mentioned above or intraabdominal catastrophes.  I highly doubt this is a PE or acute coronary syndrome and as she is tender in the abdomen would not do any further workup for this.  Doubt perforated ulcer at this point.  Will refer back to primary and do scheduled medication for stomach acid reduction x 14 days.  Consider endoscopy if further symptoms despite this.  Gastritis precautions given for home. She is in stable condition at the time of discharge, indications for return to the ED were discussed as well as follow up. All questions were answered and patient is in agreement with the plan.        Diagnosis:    ICD-10-CM    1. Abdominal pain, epigastric  R10.13        Discharge Medications:  New Prescriptions    OMEPRAZOLE (PRILOSEC) 20 MG DR CAPSULE    Take 1 capsule (20 mg) by mouth daily for 14 days       Scribe Disclosure:  ICirilo, am serving as a scribe trainee at 5:54 PM on 11/24/2021 to document services personally performed by Edgard Rojas MD based on my observations and the provider's statements to me.     Scribe Disclosure:  Lauryn TALBERT, am serving as a scribe  at 10:01 PM on 11/24/2021 to document services personally  performed by Edgard Rojas MD based on my observations and the provider's statements to me.              Edgard Rojas MD  11/25/21 1051

## 2023-01-20 NOTE — PLAN OF CARE
PRIMARY DIAGNOSIS: ACUTE Left knee PAIN  OUTPATIENT/OBSERVATION GOALS TO BE MET BEFORE DISCHARGE:  1. Pain Status: Improved-controlled with oral pain medications.     2. Return to near baseline physical activity: No     3. Cleared for discharge by consultants (if involved): No- PT to see in AM     Discharge Planner Nurse   Safe discharge environment identified: Yes  Barriers to discharge: Yes       Entered by: Clarisse Roldan  05/13/2019 9:20PM  Please review provider order for any additional goals.   Nurse to notify provider when observation goals have been met and patient is ready for discharge.        Patient informed of results and Dr. West's recommendations.  She verbalizes understanding and is agreeable to repeating creatinine level, order placed.  Patient is agreeable to diabetes education, order placed and a message was sent to the Referral Specialist to assist patient with scheduling an appointment.  Patient confirms she is taking levothyroxine 50mcg daily.

## 2023-08-12 NOTE — PROGRESS NOTES
Duplicate request Varicose veins of both lower extremities; Not painful but left ankle does get swollen. Just got compression stockings.   Marilyn Durbin CNP referring.   * Kacey Ly confirmed JOB#[id number]

## 2024-02-23 RX ORDER — VITAMIN B COMPLEX
2000 TABLET ORAL DAILY
COMMUNITY

## 2024-02-23 RX ORDER — MULTIVIT-MIN/IRON/FOLIC ACID/K 18-600-40
1 CAPSULE ORAL DAILY
COMMUNITY

## 2024-02-23 NOTE — PROVIDER NOTIFICATION
02/23/24 1006   Discharge Planning   Patient/Family Anticipates Transition to home with family   Living Arrangements   People in Home child(janina), adult   Is your private residence a single family home or apartment? Single family home   Number of Stairs, Within Home, Primary greater than 10 stairs   Stair Railings, Within Home, Primary railings safe and in good condition   Once home, are you able to live on one level? Yes   Which level? Main Level   Which rooms are not on the main level? Bedroom   Bathroom Shower/Tub Tub/Shower unit   Equipment Currently Used at Home cane, straight;walker, standard   Support System   Support Systems Family Members;Children   Do you have someone available to stay with you one or two nights once you are home? Yes   Medical Clearance   Date of Physical 02/07/24   It is recommended that you call and check with any specialty providers before surgery to see if you need surgical clearance.  Do you see any specialty providers outside of your primary care provider? No   Blood   Known Bleeding Disorder or Coagulopathy No   Does the patient have any Anabaptism/cultural preferences related to blood products? No   Education   Has the patient scheduled or completed pre-op total joint education, either in class or online, in the last 12 months? No  (will review book and pre recorded class with her daughter to interpret.)

## 2024-03-11 ENCOUNTER — TRANSFERRED RECORDS (OUTPATIENT)
Dept: MULTI SPECIALTY CLINIC | Facility: CLINIC | Age: 64
End: 2024-03-11

## 2024-03-11 LAB
ALT SERPL-CCNC: 12 U/L (ref 6–29)
AST SERPL-CCNC: 15 U/L (ref 10–35)
CREATININE (EXTERNAL): 0.62 MG/DL (ref 0.5–1.05)
GFR ESTIMATED (EXTERNAL): 100 ML/MIN/1.73M2
GLUCOSE (EXTERNAL): 99 MG/DL (ref 65–99)
HBA1C MFR BLD: 6.1 %
INR (EXTERNAL): 1 (ref 0.9–1.1)
POTASSIUM (EXTERNAL): 4.1 MMOL/L (ref 3.5–5.3)

## 2024-03-18 ENCOUNTER — APPOINTMENT (OUTPATIENT)
Dept: PHYSICAL THERAPY | Facility: CLINIC | Age: 64
End: 2024-03-18
Attending: ORTHOPAEDIC SURGERY
Payer: COMMERCIAL

## 2024-03-18 ENCOUNTER — ANESTHESIA (OUTPATIENT)
Dept: SURGERY | Facility: CLINIC | Age: 64
End: 2024-03-18
Payer: COMMERCIAL

## 2024-03-18 ENCOUNTER — ANESTHESIA EVENT (OUTPATIENT)
Dept: SURGERY | Facility: CLINIC | Age: 64
End: 2024-03-18
Payer: COMMERCIAL

## 2024-03-18 ENCOUNTER — HOSPITAL ENCOUNTER (OUTPATIENT)
Facility: CLINIC | Age: 64
Discharge: HOME | End: 2024-03-19
Attending: ORTHOPAEDIC SURGERY | Admitting: ORTHOPAEDIC SURGERY
Payer: COMMERCIAL

## 2024-03-18 ENCOUNTER — APPOINTMENT (OUTPATIENT)
Dept: GENERAL RADIOLOGY | Facility: CLINIC | Age: 64
End: 2024-03-18
Attending: ORTHOPAEDIC SURGERY
Payer: COMMERCIAL

## 2024-03-18 DIAGNOSIS — Z96.651 S/P TOTAL KNEE ARTHROPLASTY, RIGHT: Primary | ICD-10-CM

## 2024-03-18 LAB
CREAT SERPL-MCNC: 0.57 MG/DL (ref 0.51–0.95)
EGFRCR SERPLBLD CKD-EPI 2021: >90 ML/MIN/1.73M2

## 2024-03-18 PROCEDURE — 250N000013 HC RX MED GY IP 250 OP 250 PS 637: Performed by: PHYSICIAN ASSISTANT

## 2024-03-18 PROCEDURE — 999N000141 HC STATISTIC PRE-PROCEDURE NURSING ASSESSMENT: Performed by: ORTHOPAEDIC SURGERY

## 2024-03-18 PROCEDURE — 710N000009 HC RECOVERY PHASE 1, LEVEL 1, PER MIN: Performed by: ORTHOPAEDIC SURGERY

## 2024-03-18 PROCEDURE — 82565 ASSAY OF CREATININE: CPT | Performed by: ORTHOPAEDIC SURGERY

## 2024-03-18 PROCEDURE — 97110 THERAPEUTIC EXERCISES: CPT | Mod: GP

## 2024-03-18 PROCEDURE — 97161 PT EVAL LOW COMPLEX 20 MIN: CPT | Mod: GP

## 2024-03-18 PROCEDURE — 250N000009 HC RX 250: Performed by: ANESTHESIOLOGY

## 2024-03-18 PROCEDURE — 360N000077 HC SURGERY LEVEL 4, PER MIN: Performed by: ORTHOPAEDIC SURGERY

## 2024-03-18 PROCEDURE — 258N000003 HC RX IP 258 OP 636

## 2024-03-18 PROCEDURE — 250N000011 HC RX IP 250 OP 636: Performed by: ORTHOPAEDIC SURGERY

## 2024-03-18 PROCEDURE — 36415 COLL VENOUS BLD VENIPUNCTURE: CPT | Performed by: ORTHOPAEDIC SURGERY

## 2024-03-18 PROCEDURE — 250N000013 HC RX MED GY IP 250 OP 250 PS 637: Performed by: ANESTHESIOLOGY

## 2024-03-18 PROCEDURE — 250N000011 HC RX IP 250 OP 636

## 2024-03-18 PROCEDURE — 272N000001 HC OR GENERAL SUPPLY STERILE: Performed by: ORTHOPAEDIC SURGERY

## 2024-03-18 PROCEDURE — 250N000025 HC SEVOFLURANE, PER MIN: Performed by: ORTHOPAEDIC SURGERY

## 2024-03-18 PROCEDURE — 258N000001 HC RX 258: Performed by: ORTHOPAEDIC SURGERY

## 2024-03-18 PROCEDURE — 250N000011 HC RX IP 250 OP 636: Performed by: PHYSICIAN ASSISTANT

## 2024-03-18 PROCEDURE — 250N000009 HC RX 250: Performed by: ORTHOPAEDIC SURGERY

## 2024-03-18 PROCEDURE — 99207 PR NO BILLABLE SERVICE THIS VISIT: CPT | Performed by: PHYSICIAN ASSISTANT

## 2024-03-18 PROCEDURE — 999N000065 XR KNEE PORT RIGHT 1/2 VIEWS: Mod: RT

## 2024-03-18 PROCEDURE — 250N000011 HC RX IP 250 OP 636: Performed by: ANESTHESIOLOGY

## 2024-03-18 PROCEDURE — 258N000003 HC RX IP 258 OP 636: Performed by: ORTHOPAEDIC SURGERY

## 2024-03-18 PROCEDURE — C1776 JOINT DEVICE (IMPLANTABLE): HCPCS | Performed by: ORTHOPAEDIC SURGERY

## 2024-03-18 PROCEDURE — 370N000017 HC ANESTHESIA TECHNICAL FEE, PER MIN: Performed by: ORTHOPAEDIC SURGERY

## 2024-03-18 PROCEDURE — C1713 ANCHOR/SCREW BN/BN,TIS/BN: HCPCS | Performed by: ORTHOPAEDIC SURGERY

## 2024-03-18 PROCEDURE — 250N000009 HC RX 250

## 2024-03-18 PROCEDURE — 97530 THERAPEUTIC ACTIVITIES: CPT | Mod: GP

## 2024-03-18 PROCEDURE — 250N000013 HC RX MED GY IP 250 OP 250 PS 637: Performed by: ORTHOPAEDIC SURGERY

## 2024-03-18 DEVICE — IMPLANTABLE DEVICE
Type: IMPLANTABLE DEVICE | Site: KNEE | Status: FUNCTIONAL
Brand: PERSONA® NATURAL TIBIA®

## 2024-03-18 DEVICE — SPEEDSET FULL DOSE ANTIBIOTIC BONE CEMENT, 10 PACK CATALOG NUMBER IS 6192-1-010
Type: IMPLANTABLE DEVICE | Site: KNEE | Status: FUNCTIONAL
Brand: SIMPLEX

## 2024-03-18 DEVICE — IMPLANTABLE DEVICE
Type: IMPLANTABLE DEVICE | Site: KNEE | Status: FUNCTIONAL
Brand: PERSONA®

## 2024-03-18 DEVICE — IMPLANTABLE DEVICE
Type: IMPLANTABLE DEVICE | Site: KNEE | Status: FUNCTIONAL
Brand: PERSONA® VIVACIT-E®

## 2024-03-18 RX ORDER — ACETAMINOPHEN 325 MG/1
650 TABLET ORAL EVERY 4 HOURS PRN
Status: DISCONTINUED | OUTPATIENT
Start: 2024-03-21 | End: 2024-03-19 | Stop reason: HOSPADM

## 2024-03-18 RX ORDER — NALOXONE HYDROCHLORIDE 0.4 MG/ML
0.1 INJECTION, SOLUTION INTRAMUSCULAR; INTRAVENOUS; SUBCUTANEOUS
Status: CANCELLED | OUTPATIENT
Start: 2024-03-18

## 2024-03-18 RX ORDER — ONDANSETRON 2 MG/ML
4 INJECTION INTRAMUSCULAR; INTRAVENOUS EVERY 30 MIN PRN
Status: CANCELLED | OUTPATIENT
Start: 2024-03-18

## 2024-03-18 RX ORDER — SODIUM CHLORIDE, SODIUM LACTATE, POTASSIUM CHLORIDE, CALCIUM CHLORIDE 600; 310; 30; 20 MG/100ML; MG/100ML; MG/100ML; MG/100ML
INJECTION, SOLUTION INTRAVENOUS CONTINUOUS
Status: DISCONTINUED | OUTPATIENT
Start: 2024-03-18 | End: 2024-03-19 | Stop reason: HOSPADM

## 2024-03-18 RX ORDER — KETOROLAC TROMETHAMINE 30 MG/ML
INJECTION, SOLUTION INTRAMUSCULAR; INTRAVENOUS PRN
Status: DISCONTINUED | OUTPATIENT
Start: 2024-03-18 | End: 2024-03-18

## 2024-03-18 RX ORDER — FENTANYL CITRATE 50 UG/ML
25 INJECTION, SOLUTION INTRAMUSCULAR; INTRAVENOUS EVERY 5 MIN PRN
Status: DISCONTINUED | OUTPATIENT
Start: 2024-03-18 | End: 2024-03-18 | Stop reason: HOSPADM

## 2024-03-18 RX ORDER — FENTANYL CITRATE 50 UG/ML
50 INJECTION, SOLUTION INTRAMUSCULAR; INTRAVENOUS EVERY 5 MIN PRN
Status: DISCONTINUED | OUTPATIENT
Start: 2024-03-18 | End: 2024-03-18 | Stop reason: HOSPADM

## 2024-03-18 RX ORDER — CITALOPRAM HYDROBROMIDE 20 MG/1
40 TABLET ORAL DAILY
Status: DISCONTINUED | OUTPATIENT
Start: 2024-03-19 | End: 2024-03-19 | Stop reason: HOSPADM

## 2024-03-18 RX ORDER — NALOXONE HYDROCHLORIDE 0.4 MG/ML
0.1 INJECTION, SOLUTION INTRAMUSCULAR; INTRAVENOUS; SUBCUTANEOUS
Status: DISCONTINUED | OUTPATIENT
Start: 2024-03-18 | End: 2024-03-18 | Stop reason: HOSPADM

## 2024-03-18 RX ORDER — ENOXAPARIN SODIUM 100 MG/ML
40 INJECTION SUBCUTANEOUS EVERY 24 HOURS
Status: DISCONTINUED | OUTPATIENT
Start: 2024-03-19 | End: 2024-03-19 | Stop reason: HOSPADM

## 2024-03-18 RX ORDER — HYDROXYZINE HYDROCHLORIDE 25 MG/1
25 TABLET, FILM COATED ORAL EVERY 6 HOURS PRN
Status: DISCONTINUED | OUTPATIENT
Start: 2024-03-18 | End: 2024-03-19 | Stop reason: HOSPADM

## 2024-03-18 RX ORDER — CEFAZOLIN SODIUM/WATER 2 G/20 ML
2 SYRINGE (ML) INTRAVENOUS SEE ADMIN INSTRUCTIONS
Status: DISCONTINUED | OUTPATIENT
Start: 2024-03-18 | End: 2024-03-18 | Stop reason: HOSPADM

## 2024-03-18 RX ORDER — CEFAZOLIN SODIUM 2 G/100ML
2 INJECTION, SOLUTION INTRAVENOUS EVERY 8 HOURS
Qty: 200 ML | Refills: 0 | Status: COMPLETED | OUTPATIENT
Start: 2024-03-18 | End: 2024-03-19

## 2024-03-18 RX ORDER — SCOLOPAMINE TRANSDERMAL SYSTEM 1 MG/1
1 PATCH, EXTENDED RELEASE TRANSDERMAL ONCE
Status: DISCONTINUED | OUTPATIENT
Start: 2024-03-18 | End: 2024-03-18 | Stop reason: HOSPADM

## 2024-03-18 RX ORDER — TRANEXAMIC ACID 650 MG/1
1950 TABLET ORAL ONCE
Status: DISCONTINUED | OUTPATIENT
Start: 2024-03-18 | End: 2024-03-18 | Stop reason: HOSPADM

## 2024-03-18 RX ORDER — CEFAZOLIN SODIUM/WATER 2 G/20 ML
2 SYRINGE (ML) INTRAVENOUS
Status: COMPLETED | OUTPATIENT
Start: 2024-03-18 | End: 2024-03-18

## 2024-03-18 RX ORDER — HYDROMORPHONE HCL IN WATER/PF 6 MG/30 ML
0.2 PATIENT CONTROLLED ANALGESIA SYRINGE INTRAVENOUS
Status: DISCONTINUED | OUTPATIENT
Start: 2024-03-18 | End: 2024-03-19 | Stop reason: HOSPADM

## 2024-03-18 RX ORDER — SODIUM CHLORIDE, SODIUM LACTATE, POTASSIUM CHLORIDE, CALCIUM CHLORIDE 600; 310; 30; 20 MG/100ML; MG/100ML; MG/100ML; MG/100ML
INJECTION, SOLUTION INTRAVENOUS CONTINUOUS
Status: DISCONTINUED | OUTPATIENT
Start: 2024-03-18 | End: 2024-03-18 | Stop reason: HOSPADM

## 2024-03-18 RX ORDER — LIDOCAINE 40 MG/G
CREAM TOPICAL
Status: DISCONTINUED | OUTPATIENT
Start: 2024-03-18 | End: 2024-03-19 | Stop reason: HOSPADM

## 2024-03-18 RX ORDER — KETOROLAC TROMETHAMINE 30 MG/ML
15 INJECTION, SOLUTION INTRAMUSCULAR; INTRAVENOUS
Status: DISCONTINUED | OUTPATIENT
Start: 2024-03-18 | End: 2024-03-18 | Stop reason: HOSPADM

## 2024-03-18 RX ORDER — BUPIVACAINE HYDROCHLORIDE AND EPINEPHRINE 5; 5 MG/ML; UG/ML
INJECTION, SOLUTION PERINEURAL
Status: COMPLETED | OUTPATIENT
Start: 2024-03-18 | End: 2024-03-18

## 2024-03-18 RX ORDER — PROPOFOL 10 MG/ML
INJECTION, EMULSION INTRAVENOUS CONTINUOUS PRN
Status: DISCONTINUED | OUTPATIENT
Start: 2024-03-18 | End: 2024-03-18

## 2024-03-18 RX ORDER — LABETALOL HYDROCHLORIDE 5 MG/ML
10 INJECTION, SOLUTION INTRAVENOUS EVERY 5 MIN PRN
Status: DISCONTINUED | OUTPATIENT
Start: 2024-03-18 | End: 2024-03-18 | Stop reason: HOSPADM

## 2024-03-18 RX ORDER — AMOXICILLIN 250 MG
1 CAPSULE ORAL 2 TIMES DAILY
Status: DISCONTINUED | OUTPATIENT
Start: 2024-03-18 | End: 2024-03-19 | Stop reason: HOSPADM

## 2024-03-18 RX ORDER — DEXAMETHASONE SODIUM PHOSPHATE 4 MG/ML
INJECTION, SOLUTION INTRA-ARTICULAR; INTRALESIONAL; INTRAMUSCULAR; INTRAVENOUS; SOFT TISSUE PRN
Status: DISCONTINUED | OUTPATIENT
Start: 2024-03-18 | End: 2024-03-18

## 2024-03-18 RX ORDER — SODIUM CHLORIDE, SODIUM LACTATE, POTASSIUM CHLORIDE, CALCIUM CHLORIDE 600; 310; 30; 20 MG/100ML; MG/100ML; MG/100ML; MG/100ML
INJECTION, SOLUTION INTRAVENOUS CONTINUOUS PRN
Status: DISCONTINUED | OUTPATIENT
Start: 2024-03-18 | End: 2024-03-18

## 2024-03-18 RX ORDER — IBUPROFEN 600 MG/1
600 TABLET, FILM COATED ORAL EVERY 6 HOURS PRN
Qty: 30 TABLET | Refills: 0 | Status: SHIPPED | OUTPATIENT
Start: 2024-03-18

## 2024-03-18 RX ORDER — ATORVASTATIN CALCIUM 40 MG/1
40 TABLET, FILM COATED ORAL EVERY EVENING
Status: DISCONTINUED | OUTPATIENT
Start: 2024-03-18 | End: 2024-03-19 | Stop reason: HOSPADM

## 2024-03-18 RX ORDER — FENTANYL CITRATE 50 UG/ML
INJECTION, SOLUTION INTRAMUSCULAR; INTRAVENOUS PRN
Status: DISCONTINUED | OUTPATIENT
Start: 2024-03-18 | End: 2024-03-18

## 2024-03-18 RX ORDER — ONDANSETRON 2 MG/ML
4 INJECTION INTRAMUSCULAR; INTRAVENOUS EVERY 6 HOURS
Qty: 8 ML | Refills: 0 | Status: COMPLETED | OUTPATIENT
Start: 2024-03-18 | End: 2024-03-19

## 2024-03-18 RX ORDER — ONDANSETRON 4 MG/1
4 TABLET, ORALLY DISINTEGRATING ORAL EVERY 6 HOURS PRN
Status: DISCONTINUED | OUTPATIENT
Start: 2024-03-18 | End: 2024-03-19 | Stop reason: HOSPADM

## 2024-03-18 RX ORDER — HYDROMORPHONE HCL IN WATER/PF 6 MG/30 ML
0.4 PATIENT CONTROLLED ANALGESIA SYRINGE INTRAVENOUS
Status: DISCONTINUED | OUTPATIENT
Start: 2024-03-18 | End: 2024-03-19 | Stop reason: HOSPADM

## 2024-03-18 RX ORDER — OXYCODONE HYDROCHLORIDE 10 MG/1
10 TABLET ORAL EVERY 4 HOURS PRN
Status: DISCONTINUED | OUTPATIENT
Start: 2024-03-18 | End: 2024-03-19 | Stop reason: HOSPADM

## 2024-03-18 RX ORDER — NALOXONE HYDROCHLORIDE 0.4 MG/ML
0.4 INJECTION, SOLUTION INTRAMUSCULAR; INTRAVENOUS; SUBCUTANEOUS
Status: DISCONTINUED | OUTPATIENT
Start: 2024-03-18 | End: 2024-03-19 | Stop reason: HOSPADM

## 2024-03-18 RX ORDER — ACETAMINOPHEN 325 MG/1
650 TABLET ORAL EVERY 4 HOURS PRN
Qty: 100 TABLET | Refills: 0 | Status: SHIPPED | OUTPATIENT
Start: 2024-03-18

## 2024-03-18 RX ORDER — VANCOMYCIN HYDROCHLORIDE 1 G/20ML
INJECTION, POWDER, LYOPHILIZED, FOR SOLUTION INTRAVENOUS PRN
Status: DISCONTINUED | OUTPATIENT
Start: 2024-03-18 | End: 2024-03-18 | Stop reason: HOSPADM

## 2024-03-18 RX ORDER — AMLODIPINE BESYLATE 10 MG/1
10 TABLET ORAL DAILY
Status: DISCONTINUED | OUTPATIENT
Start: 2024-03-19 | End: 2024-03-19 | Stop reason: HOSPADM

## 2024-03-18 RX ORDER — IBUPROFEN 600 MG/1
600 TABLET, FILM COATED ORAL EVERY 6 HOURS PRN
Status: DISCONTINUED | OUTPATIENT
Start: 2024-03-18 | End: 2024-03-19 | Stop reason: HOSPADM

## 2024-03-18 RX ORDER — ASPIRIN 325 MG
325 TABLET, DELAYED RELEASE (ENTERIC COATED) ORAL 2 TIMES DAILY
Qty: 70 TABLET | Refills: 0 | Status: SHIPPED | OUTPATIENT
Start: 2024-03-18

## 2024-03-18 RX ORDER — LISINOPRIL 10 MG/1
10 TABLET ORAL DAILY
Status: DISCONTINUED | OUTPATIENT
Start: 2024-03-19 | End: 2024-03-19 | Stop reason: HOSPADM

## 2024-03-18 RX ORDER — LIDOCAINE 40 MG/G
CREAM TOPICAL
Status: DISCONTINUED | OUTPATIENT
Start: 2024-03-18 | End: 2024-03-18 | Stop reason: HOSPADM

## 2024-03-18 RX ORDER — DIPHENHYDRAMINE HCL 25 MG
25 CAPSULE ORAL EVERY 6 HOURS PRN
Status: DISCONTINUED | OUTPATIENT
Start: 2024-03-18 | End: 2024-03-19 | Stop reason: HOSPADM

## 2024-03-18 RX ORDER — LIDOCAINE HYDROCHLORIDE 20 MG/ML
INJECTION, SOLUTION INFILTRATION; PERINEURAL PRN
Status: DISCONTINUED | OUTPATIENT
Start: 2024-03-18 | End: 2024-03-18

## 2024-03-18 RX ORDER — FAMOTIDINE 20 MG/1
20 TABLET, FILM COATED ORAL 2 TIMES DAILY
Status: DISCONTINUED | OUTPATIENT
Start: 2024-03-18 | End: 2024-03-19 | Stop reason: HOSPADM

## 2024-03-18 RX ORDER — AMOXICILLIN 250 MG
1-2 CAPSULE ORAL 2 TIMES DAILY
Qty: 30 TABLET | Refills: 0 | Status: SHIPPED | OUTPATIENT
Start: 2024-03-18

## 2024-03-18 RX ORDER — POLYETHYLENE GLYCOL 3350 17 G/17G
17 POWDER, FOR SOLUTION ORAL DAILY
Status: DISCONTINUED | OUTPATIENT
Start: 2024-03-19 | End: 2024-03-19 | Stop reason: HOSPADM

## 2024-03-18 RX ORDER — ACETAMINOPHEN 325 MG/1
975 TABLET ORAL ONCE
Status: COMPLETED | OUTPATIENT
Start: 2024-03-18 | End: 2024-03-18

## 2024-03-18 RX ORDER — ONDANSETRON 2 MG/ML
INJECTION INTRAMUSCULAR; INTRAVENOUS PRN
Status: DISCONTINUED | OUTPATIENT
Start: 2024-03-18 | End: 2024-03-18

## 2024-03-18 RX ORDER — OXYCODONE HYDROCHLORIDE 5 MG/1
5 TABLET ORAL
Status: CANCELLED | OUTPATIENT
Start: 2024-03-18

## 2024-03-18 RX ORDER — OXYCODONE HYDROCHLORIDE 5 MG/1
5 TABLET ORAL EVERY 4 HOURS PRN
Status: DISCONTINUED | OUTPATIENT
Start: 2024-03-18 | End: 2024-03-19 | Stop reason: HOSPADM

## 2024-03-18 RX ORDER — BISACODYL 10 MG
10 SUPPOSITORY, RECTAL RECTAL DAILY PRN
Status: DISCONTINUED | OUTPATIENT
Start: 2024-03-18 | End: 2024-03-19 | Stop reason: HOSPADM

## 2024-03-18 RX ORDER — ONDANSETRON 2 MG/ML
4 INJECTION INTRAMUSCULAR; INTRAVENOUS EVERY 6 HOURS PRN
Status: DISCONTINUED | OUTPATIENT
Start: 2024-03-18 | End: 2024-03-19 | Stop reason: HOSPADM

## 2024-03-18 RX ORDER — ONDANSETRON 4 MG/1
4 TABLET, ORALLY DISINTEGRATING ORAL EVERY 30 MIN PRN
Status: DISCONTINUED | OUTPATIENT
Start: 2024-03-18 | End: 2024-03-18 | Stop reason: HOSPADM

## 2024-03-18 RX ORDER — ONDANSETRON 2 MG/ML
4 INJECTION INTRAMUSCULAR; INTRAVENOUS EVERY 30 MIN PRN
Status: DISCONTINUED | OUTPATIENT
Start: 2024-03-18 | End: 2024-03-18 | Stop reason: HOSPADM

## 2024-03-18 RX ORDER — HYDROXYZINE HYDROCHLORIDE 25 MG/1
25 TABLET, FILM COATED ORAL EVERY 6 HOURS PRN
Qty: 30 TABLET | Refills: 0 | Status: SHIPPED | OUTPATIENT
Start: 2024-03-18

## 2024-03-18 RX ORDER — CELECOXIB 200 MG/1
400 CAPSULE ORAL ONCE
Status: COMPLETED | OUTPATIENT
Start: 2024-03-18 | End: 2024-03-18

## 2024-03-18 RX ORDER — PROCHLORPERAZINE MALEATE 10 MG
10 TABLET ORAL EVERY 6 HOURS PRN
Status: DISCONTINUED | OUTPATIENT
Start: 2024-03-18 | End: 2024-03-19 | Stop reason: HOSPADM

## 2024-03-18 RX ORDER — NALOXONE HYDROCHLORIDE 0.4 MG/ML
0.2 INJECTION, SOLUTION INTRAMUSCULAR; INTRAVENOUS; SUBCUTANEOUS
Status: DISCONTINUED | OUTPATIENT
Start: 2024-03-18 | End: 2024-03-19 | Stop reason: HOSPADM

## 2024-03-18 RX ORDER — PROPOFOL 10 MG/ML
INJECTION, EMULSION INTRAVENOUS PRN
Status: DISCONTINUED | OUTPATIENT
Start: 2024-03-18 | End: 2024-03-18

## 2024-03-18 RX ORDER — OXYCODONE HYDROCHLORIDE 5 MG/1
5-10 TABLET ORAL EVERY 4 HOURS PRN
Qty: 26 TABLET | Refills: 0 | Status: SHIPPED | OUTPATIENT
Start: 2024-03-18

## 2024-03-18 RX ORDER — OXYCODONE HYDROCHLORIDE 5 MG/1
10 TABLET ORAL
Status: CANCELLED | OUTPATIENT
Start: 2024-03-18

## 2024-03-18 RX ORDER — ACETAMINOPHEN 325 MG/1
975 TABLET ORAL EVERY 8 HOURS
Qty: 27 TABLET | Refills: 0 | Status: DISCONTINUED | OUTPATIENT
Start: 2024-03-18 | End: 2024-03-19 | Stop reason: HOSPADM

## 2024-03-18 RX ORDER — ONDANSETRON 4 MG/1
4 TABLET, ORALLY DISINTEGRATING ORAL EVERY 30 MIN PRN
Status: CANCELLED | OUTPATIENT
Start: 2024-03-18

## 2024-03-18 RX ADMIN — MIDAZOLAM 1 MG: 1 INJECTION INTRAMUSCULAR; INTRAVENOUS at 09:51

## 2024-03-18 RX ADMIN — ONDANSETRON 4 MG: 2 INJECTION INTRAMUSCULAR; INTRAVENOUS at 16:03

## 2024-03-18 RX ADMIN — HYDROMORPHONE HYDROCHLORIDE 0.4 MG: 1 INJECTION, SOLUTION INTRAMUSCULAR; INTRAVENOUS; SUBCUTANEOUS at 13:05

## 2024-03-18 RX ADMIN — PROPOFOL 50 MCG/KG/MIN: 10 INJECTION, EMULSION INTRAVENOUS at 09:57

## 2024-03-18 RX ADMIN — SODIUM CHLORIDE, POTASSIUM CHLORIDE, SODIUM LACTATE AND CALCIUM CHLORIDE: 600; 310; 30; 20 INJECTION, SOLUTION INTRAVENOUS at 14:31

## 2024-03-18 RX ADMIN — TRANEXAMIC ACID 1 G: 1 INJECTION, SOLUTION INTRAVENOUS at 11:18

## 2024-03-18 RX ADMIN — HYDROMORPHONE HYDROCHLORIDE 0.4 MG: 0.2 INJECTION, SOLUTION INTRAMUSCULAR; INTRAVENOUS; SUBCUTANEOUS at 21:27

## 2024-03-18 RX ADMIN — HYDROMORPHONE HYDROCHLORIDE 0.4 MG: 0.2 INJECTION, SOLUTION INTRAMUSCULAR; INTRAVENOUS; SUBCUTANEOUS at 16:57

## 2024-03-18 RX ADMIN — LIDOCAINE HYDROCHLORIDE 30 MG: 20 INJECTION, SOLUTION INFILTRATION; PERINEURAL at 09:56

## 2024-03-18 RX ADMIN — SODIUM CHLORIDE 1000 ML: 9 INJECTION, SOLUTION INTRAVENOUS at 14:31

## 2024-03-18 RX ADMIN — CELECOXIB 400 MG: 200 CAPSULE ORAL at 08:50

## 2024-03-18 RX ADMIN — OXYCODONE HYDROCHLORIDE 10 MG: 10 TABLET ORAL at 22:33

## 2024-03-18 RX ADMIN — ONDANSETRON 4 MG: 2 INJECTION INTRAMUSCULAR; INTRAVENOUS at 10:19

## 2024-03-18 RX ADMIN — FENTANYL CITRATE 50 MCG: 50 INJECTION, SOLUTION INTRAMUSCULAR; INTRAVENOUS at 12:15

## 2024-03-18 RX ADMIN — FAMOTIDINE 20 MG: 20 TABLET ORAL at 20:52

## 2024-03-18 RX ADMIN — CEFAZOLIN SODIUM 2 G: 2 INJECTION, SOLUTION INTRAVENOUS at 17:11

## 2024-03-18 RX ADMIN — ACETAMINOPHEN 975 MG: 325 TABLET, FILM COATED ORAL at 13:03

## 2024-03-18 RX ADMIN — FENTANYL CITRATE 100 MCG: 50 INJECTION INTRAMUSCULAR; INTRAVENOUS at 09:56

## 2024-03-18 RX ADMIN — OXYCODONE HYDROCHLORIDE 10 MG: 10 TABLET ORAL at 18:02

## 2024-03-18 RX ADMIN — Medication 2 G: at 09:51

## 2024-03-18 RX ADMIN — SODIUM CHLORIDE, POTASSIUM CHLORIDE, SODIUM LACTATE AND CALCIUM CHLORIDE: 600; 310; 30; 20 INJECTION, SOLUTION INTRAVENOUS at 21:05

## 2024-03-18 RX ADMIN — BUPIVACAINE HYDROCHLORIDE AND EPINEPHRINE BITARTRATE 20 ML: 5; .005 INJECTION, SOLUTION PERINEURAL at 09:39

## 2024-03-18 RX ADMIN — DEXAMETHASONE SODIUM PHOSPHATE 8 MG: 4 INJECTION, SOLUTION INTRA-ARTICULAR; INTRALESIONAL; INTRAMUSCULAR; INTRAVENOUS; SOFT TISSUE at 09:56

## 2024-03-18 RX ADMIN — TRANEXAMIC ACID 1 G: 1 INJECTION, SOLUTION INTRAVENOUS at 09:54

## 2024-03-18 RX ADMIN — ACETAMINOPHEN 975 MG: 325 TABLET, FILM COATED ORAL at 22:32

## 2024-03-18 RX ADMIN — ATORVASTATIN CALCIUM 40 MG: 40 TABLET, FILM COATED ORAL at 20:52

## 2024-03-18 RX ADMIN — FENTANYL CITRATE 50 MCG: 50 INJECTION, SOLUTION INTRAMUSCULAR; INTRAVENOUS at 12:09

## 2024-03-18 RX ADMIN — PHENYLEPHRINE HYDROCHLORIDE 0.5 MCG/KG/MIN: 10 INJECTION INTRAVENOUS at 10:40

## 2024-03-18 RX ADMIN — SODIUM CHLORIDE, POTASSIUM CHLORIDE, SODIUM LACTATE AND CALCIUM CHLORIDE: 600; 310; 30; 20 INJECTION, SOLUTION INTRAVENOUS at 09:53

## 2024-03-18 RX ADMIN — SODIUM CHLORIDE, POTASSIUM CHLORIDE, SODIUM LACTATE AND CALCIUM CHLORIDE: 600; 310; 30; 20 INJECTION, SOLUTION INTRAVENOUS at 11:19

## 2024-03-18 RX ADMIN — OXYCODONE HYDROCHLORIDE 5 MG: 5 TABLET ORAL at 13:30

## 2024-03-18 RX ADMIN — FENTANYL CITRATE 100 MCG: 50 INJECTION INTRAMUSCULAR; INTRAVENOUS at 10:25

## 2024-03-18 RX ADMIN — ONDANSETRON 4 MG: 2 INJECTION INTRAMUSCULAR; INTRAVENOUS at 22:35

## 2024-03-18 RX ADMIN — KETOROLAC TROMETHAMINE 15 MG: 30 INJECTION, SOLUTION INTRAMUSCULAR at 09:57

## 2024-03-18 RX ADMIN — SENNOSIDES AND DOCUSATE SODIUM 1 TABLET: 8.6; 5 TABLET ORAL at 20:52

## 2024-03-18 RX ADMIN — MIDAZOLAM 1 MG: 1 INJECTION INTRAMUSCULAR; INTRAVENOUS at 10:00

## 2024-03-18 RX ADMIN — HYDROMORPHONE HYDROCHLORIDE 0.4 MG: 1 INJECTION, SOLUTION INTRAMUSCULAR; INTRAVENOUS; SUBCUTANEOUS at 12:21

## 2024-03-18 RX ADMIN — PROPOFOL 160 MG: 10 INJECTION, EMULSION INTRAVENOUS at 09:56

## 2024-03-18 ASSESSMENT — ACTIVITIES OF DAILY LIVING (ADL)
ADLS_ACUITY_SCORE: 32
ADLS_ACUITY_SCORE: 31
ADLS_ACUITY_SCORE: 32

## 2024-03-18 NOTE — ANESTHESIA PREPROCEDURE EVALUATION
Anesthesia Pre-Procedure Evaluation    Patient: Lo Stewart   MRN: [id number] : 1960        Procedure : Procedure(s):  Right total knee arthroplasty          Past Medical History:   Diagnosis Date    Arthritis     HTN, goal below 140/90     Knee pain, bilateral     Major depression     Syncope       Past Surgical History:   Procedure Laterality Date    ABDOMEN SURGERY      ovarian cyst removed    COLONOSCOPY N/A 2015    Procedure: COLONOSCOPY;  Surgeon: Avel New MD;  Location: [facility]    LEG SURGERY Right     cyst removed    LUMPECTOMY BREAST Right     OOPHORECTOMY      cyst removed    ORTHOPEDIC SURGERY      rotator cuff repair right shoulder in 2015    ORTHOPEDIC SURGERY Right 3/2/2016    right knee surgical repair      No Known Allergies   Social History     Tobacco Use    Smoking status: Never     Passive exposure: Past    Smokeless tobacco: Never   Substance Use Topics    Alcohol use: Not Currently      Wt Readings from Last 1 Encounters:   24 100.9 kg (222 lb 6.4 oz)        Anesthesia Evaluation            ROS/MED HX  ENT/Pulmonary:  - neg pulmonary ROS     Neurologic:  - neg neurologic ROS     Cardiovascular:     (+)  hypertension- -   -  - -                                      METS/Exercise Tolerance: >4 METS    Hematologic:  - neg hematologic  ROS     Musculoskeletal:   (+)  arthritis,             GI/Hepatic:  - neg GI/hepatic ROS     Renal/Genitourinary:  - neg Renal ROS     Endo:     (+)               Obesity,       Psychiatric/Substance Use:     (+) psychiatric history depression       Infectious Disease:  - neg infectious disease ROS     Malignancy:  - neg malignancy ROS     Other:  - neg other ROS          Physical Exam    Airway        Mallampati: II   TM distance: > 3 FB   Neck ROM: full   Mouth opening: > 3 cm    Respiratory Devices and Support         Dental       (+) Minor Abnormalities - some fillings, tiny chips      Cardiovascular   cardiovascular exam  normal       Rhythm and rate: regular and normal     Pulmonary   pulmonary exam normal        breath sounds clear to auscultation           OUTSIDE LABS:  CBC:   Lab Results   Component Value Date    WBC 8.4 11/24/2021    WBC 6.0 12/15/2020    HGB 14.3 11/24/2021    HGB 13.5 12/15/2020    HCT 46.7 11/24/2021    HCT 42.1 12/15/2020     11/24/2021     (H) 12/15/2020     BMP:   Lab Results   Component Value Date     11/24/2021     12/15/2020    POTASSIUM 3.6 11/24/2021    POTASSIUM 3.1 (L) 12/15/2020    CHLORIDE 103 11/24/2021    CHLORIDE 105 12/15/2020    CO2 28 11/24/2021    CO2 30 12/15/2020    BUN 19 11/24/2021    BUN 19 12/15/2020    CR 0.61 11/24/2021    CR 0.77 12/15/2020    GLC 94 11/24/2021     (H) 12/15/2020     COAGS:   Lab Results   Component Value Date    INR 1.03 12/26/2016     POC:   Lab Results   Component Value Date    BGM 91 05/13/2019     HEPATIC:   Lab Results   Component Value Date    ALBUMIN 3.4 11/24/2021    PROTTOTAL 8.3 11/24/2021    ALT 29 11/24/2021    AST 21 11/24/2021    ALKPHOS 123 11/24/2021    BILITOTAL 0.5 11/24/2021     OTHER:   Lab Results   Component Value Date    PEDRO 9.5 11/24/2021    LIPASE 109 11/24/2021    TSH 1.58 03/17/2015    T4 1.23 03/17/2015       Anesthesia Plan    ASA Status:  2    NPO Status:  NPO Appropriate    Anesthesia Type: General.     - Airway: LMA   Induction: Intravenous.   Maintenance: Balanced.        Consents    Anesthesia Plan(s) and associated risks, benefits, and realistic alternatives discussed. Questions answered and patient/representative(s) expressed understanding.     - Discussed: Risks, Benefits and Alternatives for the PROCEDURE were discussed     - Discussed with:  Patient       Use of blood products discussed: Yes.     - Discussed with: Patient.     - Consented: consented to blood products            Reason for refusal: other.     Postoperative Care    Pain management: IV analgesics, Oral pain medications,  Peripheral nerve block (Single Shot).   PONV prophylaxis: Ondansetron (or other 5HT-3), Dexamethasone or Solumedrol, Scopolamine patch, Background Propofol Infusion     Comments:    Other Comments: LMA, propofol infusion. Ketorolac 15 mg, dexamethasone 8 mg, zofran 4 mg. Dilaudid PRN.           Destiney Kyle MD    I have reviewed the pertinent notes and labs in the chart from the past 30 days and (re)examined the patient.  Any updates or changes from those notes are reflected in this note.

## 2024-03-18 NOTE — ANESTHESIA POSTPROCEDURE EVALUATION
Patient: Lo Stewart    Procedure: Procedure(s):  Right total knee arthroplasty       Anesthesia Type:  General    Note:  Disposition: Admission   Postop Pain Control: Uneventful            Sign Out: Well controlled pain   PONV: No   Neuro/Psych: Uneventful            Sign Out: Acceptable/Baseline neuro status   Airway/Respiratory: Uneventful            Sign Out: Acceptable/Baseline resp. status   CV/Hemodynamics: Uneventful            Sign Out: Acceptable CV status; No obvious hypovolemia; No obvious fluid overload   Other NRE: NONE   DID A NON-ROUTINE EVENT OCCUR? No           Last vitals:  Vitals Value Taken Time   /77 03/18/24 1345   Temp 97  F (36.1  C) 03/18/24 1330   Pulse 55 03/18/24 1345   Resp 17 03/18/24 1345   SpO2 100 % 03/18/24 1345       Electronically Signed By: Destiney Kyle MD  March 18, 2024  2:11 PM

## 2024-03-18 NOTE — ANESTHESIA PROCEDURE NOTES
Airway       Patient location during procedure: OR       Procedure Start/Stop Times: 3/18/2024 9:59 AM  Staff -        Anesthesiologist:  Destiney Kyle MD       CRNA: Faustino Bearden APRN CRNA       Performed By: CRNA  Consent for Airway        Urgency: elective  Indications and Patient Condition       Indications for airway management: bouchra-procedural       Induction type:intravenous       Mask difficulty assessment: 0 - not attempted    Final Airway Details       Final airway type: supraglottic airway    Supraglottic Airway Details        Type: LMA       LMA size: 4    Post intubation assessment        Placement verified by: capnometry, equal breath sounds and chest rise        Number of attempts at approach: 1       Number of other approaches attempted: 0       Secured with: commercial tube martinez       Ease of procedure: easy       Dentition: Intact and Unchanged    Medication(s) Administered   Medication Administration Time: 3/18/2024 9:59 AM

## 2024-03-18 NOTE — PLAN OF CARE
Patient vital signs are at baseline: Yes  Patient able to ambulate as they were prior to admission or with assist devices provided by therapies during their stay:  No,  Reason:  not oob, too painful to move.   Patient MUST void prior to discharge:  No,  Reason: still due to void.   Patient able to tolerate oral intake:  No,  Reason: not oob yet.   Pain has adequate pain control using Oral analgesics:  Yes  Does patient have an identified :  Yes  Has goal D/C date and time been discussed with patient:  Yes. Arrived to room around 1400, rating pain at 10/10 upon arrival although appeared to be comfortable and dosing off between cares. Pt and family educated about the need to balance pain medications and respiratory depression. Pt rating pain at 3/10 once settled and resting. Will keep monitoring.

## 2024-03-18 NOTE — ANESTHESIA CARE TRANSFER NOTE
Patient: Lo Stewart    Procedure: Procedure(s):  Right total knee arthroplasty       Diagnosis: Osteoarthritis of right knee [M17.11]  Diagnosis Additional Information: No value filed.    Anesthesia Type:   General     Note:    Oropharynx: spontaneously breathing and oropharynx clear of all foreign objects  Level of Consciousness: awake and drowsy    Level of Supplemental Oxygen (L/min / FiO2): 6l  Independent Airway: airway patency satisfactory and stable    Vital Signs Stable: post-procedure vital signs reviewed and stable  Report to RN Given: handoff report given  Patient transferred to: PACU  Comments: Pt to PACU, VSS, report to RN  Handoff Report: Identifed the Patient, Identified the Reponsible Provider, Reviewed the pertinent medical history, Discussed the surgical course, Reviewed Intra-OP anesthesia mangement and issues during anesthesia, Set expectations for post-procedure period and Allowed opportunity for questions and acknowledgement of understanding      Vitals:  Vitals Value Taken Time   BP     Temp     Pulse 65 03/18/24 1159   Resp 16 03/18/24 1200   SpO2 97 % 03/18/24 1200   Vitals shown include unfiled device data.    Electronically Signed By: YIN Hurtado CRNA  March 18, 2024  12:02 PM

## 2024-03-18 NOTE — PROGRESS NOTES
03/18/24 1657   Appointment Info   Signing Clinician's Name / Credentials (PT) Uzma Duarte DPT   Rehab Comments (PT) RLE WBAT   Quick Adds   Quick Adds Certification       Present yes  (daughter interpreting, waiver signed per nurse)   Language German   Living Environment   People in Home child(janina), adult;grandchild(janina)   Current Living Arrangements house   Home Accessibility stairs to enter home;stairs within home   Number of Stairs, Main Entrance 2   Stair Railings, Main Entrance none   Number of Stairs, Within Home, Primary greater than 10 stairs   Stair Railings, Within Home, Primary railings safe and in good condition   Transportation Anticipated family or friend will provide   Living Environment Comments Pt lives in house with adult daughter and young grandchildren, 2STE. Pt will be staying on main level upon discharge. Adult daughter supportive and will be assisting at discharge.   Self-Care   Usual Activity Tolerance good   Current Activity Tolerance poor   Equipment Currently Used at Home cane, straight   Fall history within last six months no   Activity/Exercise/Self-Care Comment Pt IND at baseline for mobility and cares. Daughter reports she may have a walker at home to use   General Information   Onset of Illness/Injury or Date of Surgery 03/18/24   Referring Physician Kevin Meredith MD   Patient/Family Therapy Goals Statement (PT) improve pain and mobility   Pertinent History of Current Problem (include personal factors and/or comorbidities that impact the POC) POD#0 s/p R TKA   Existing Precautions/Restrictions fall;weight bearing   Weight-Bearing Status - RLE weight-bearing as tolerated   Cognition   Affect/Mental Status (Cognition) WFL   Orientation Status (Cognition) oriented x 4   Follows Commands (Cognition) WFL   Pain Assessment   Patient Currently in Pain Yes, see Vital Sign flowsheet   Integumentary/Edema   Integumentary/Edema Comments incision covered    Posture    Posture Forward head position;Protracted shoulders   Range of Motion (ROM)   Range of Motion ROM deficits secondary to surgical procedure;ROM deficits secondary to pain   Strength (Manual Muscle Testing)   Strength (Manual Muscle Testing) Deficits observed during functional mobility   Bed Mobility   Comment, (Bed Mobility) Kvng supine>sit   Transfers   Comment, (Transfers) ModA sit<>stand with FWW   Gait/Stairs (Locomotion)   Distance in Feet (Gait) 5' for eval   Comment, (Gait/Stairs) Kvng ambulation with FWW   Balance   Balance Comments good sitting balance, poor standing balance requiring FWW and Ax1 for assist   Sensory Examination   Sensory Perception patient reports no sensory changes   Clinical Impression   Criteria for Skilled Therapeutic Intervention Yes, treatment indicated   PT Diagnosis (PT) impaired mobility s/p R TKA   Influenced by the following impairments decreased activity tolerance, pain, weakness, impaired balance, decreased ROM   Functional limitations due to impairments impaired bed mobility, transfers, ambulation, stairs   Clinical Presentation (PT Evaluation Complexity) evolving   Clinical Presentation Rationale clinical judgement, chart review   Clinical Decision Making (Complexity) low complexity   Planned Therapy Interventions (PT) balance training;bed mobility training;gait training;home exercise program;neuromuscular re-education;patient/family education;ROM (range of motion);stair training;strengthening;transfer training;home program guidelines;risk factor education;progressive activity/exercise   Risk & Benefits of therapy have been explained evaluation/treatment results reviewed;care plan/treatment goals reviewed;risks/benefits reviewed;current/potential barriers reviewed;participants voiced agreement with care plan;participants included;patient   PT Total Evaluation Time   PT Eval, Low Complexity Minutes (97307) 6   Therapy Certification   Start of care date 03/18/24    Certification date from 03/18/24   Certification date to 03/25/24   Medical Diagnosis s/p total knee arthroplasty, right   Physical Therapy Goals   PT Frequency 2x/day   PT Predicted Duration/Target Date for Goal Attainment 03/20/24   PT Goals Bed Mobility;Transfers;Gait;Stairs   PT: Bed Mobility Supervision/stand-by assist;Supine to/from sit;Within precautions   PT: Transfers Supervision/stand-by assist;Sit to/from stand;Assistive device;Within precautions   PT: Gait Supervision/stand-by assist;Rolling walker;100 feet;Within precautions   PT: Stairs Minimal assist;2 stairs   Interventions   Interventions Quick Adds Therapeutic Activity;Therapeutic Procedure   Therapeutic Procedure/Exercise   Ther. Procedure: strength, endurance, ROM, flexibillity Minutes (19783) 8   Symptoms Noted During/After Treatment fatigue;increased pain   Treatment Detail/Skilled Intervention Pt cued for supine exercises including APs, heel slides, quad sets, SLRs, x5-10 each. Requiring ModA to assist with SLRs. Cueing for slow and controlled movement, encouraged to complete again tonight for strength gains.   Therapeutic Activity   Therapeutic Activities: dynamic activities to improve functional performance Minutes (41319) 15   Symptoms Noted During/After Treatment Fatigue;Increased pain   Treatment Detail/Skilled Intervention Pt supine upon arrival, daughter present and interpreting, increased time for communication and room set up. Edu on WBAT status, contracture prevention. Pt completed sit<>stand with ModA and FWW, safety cues for hand placement. Pt leaning on forearms over walker, cueing to utilize hands on walker for improved safety. Pt ambulated to toilet 5' with Min-ModA and FWW. Needing cues for each step, demos wide split stance with RLE leading, inability for LLE to step to RLE despite cueing. Pt unsteady with heavy anterior trunk lean, cueing for upright trunk for improved stability. Utilized commode riser over toilet, cueing  for backing up to surface. Able to void, completed pericares IND. ModA for toilet transfer. Ambulated another 5' to EOB, cueing for walker management. Declining further mobility d/t pain, requiring ModA and RLE to lift into bed. Daughter having questions regarding shower chairs, provided general info on types of chairs, places to obtain, and encouraged to ask OT in the AM. Left with alarm on and needs in reach, nurse updated.   PT Discharge Planning   PT Plan issue FWW? progress transfers and ambulation distance, trial stairs   PT Discharge Recommendation (DC Rec)   (defer to ortho)   PT Rationale for DC Rec Pt significantly below baseline level of mobility, currently needing up to ModA with FWW for mobility. Anticipate with pain management and IP PT that pt will progress to return home with assist from daughter.   PT Brief overview of current status Ax2 OOB mobility, 5' to toilet this date   PT Equipment Needed at Discharge walker, rolling   Total Session Time   Timed Code Treatment Minutes 23   Total Session Time (sum of timed and untimed services) 29       [facility]  OUTPATIENT PHYSICAL THERAPY EVALUATION  PLAN OF TREATMENT FOR OUTPATIENT REHABILITATION  (COMPLETE FOR INITIAL CLAIMS ONLY)  Patient's Last Name, First Name, M.I.  YOB: 1960  Lo Coughlin                           Provider's Name  [facility] Medical Record No.  [id number]                             Onset Date:  03/18/24   Start of Care Date:  03/18/24   Type:     _X_PT   ___OT   ___SLP Medical Diagnosis:  s/p total knee arthroplasty, right              PT Diagnosis:  impaired mobility s/p R TKA Visits from SOC:  1     See note for plan of treatment, functional goals and certification details    I CERTIFY THE NEED FOR THESE SERVICES FURNISHED UNDER        THIS PLAN OF TREATMENT AND WHILE UNDER MY CARE     (Physician co-signature of this document indicates review and  certification of the therapy plan).

## 2024-03-18 NOTE — ANESTHESIA PROCEDURE NOTES
Adductor canal Procedure Note    Pre-Procedure   Staff -        Anesthesiologist:  Destiney Kyle MD       Performed By: anesthesiologist       Referred By: [facility]       Location: pre-op       Procedure Start/Stop Times: 3/18/2024 9:39 AM and 3/18/2024 9:45 AM       Pre-Anesthestic Checklist: patient identified, IV checked, site marked, risks and benefits discussed, informed consent, monitors and equipment checked, pre-op evaluation, at physician/surgeon's request and post-op pain management  Timeout:       Correct Patient: Yes        Correct Procedure: Yes        Correct Site: Yes        Correct Position: Yes        Correct Laterality: Yes        Site Marked: Yes  Procedure Documentation  Procedure: Adductor canal       Laterality: right       Patient Position: supine       Skin prep: Chloraprep       Local skin infiltrated with mL of 1% lidocaine.        Needle Gauge: 20.        Needle Length (Inches): 4        Ultrasound guided       1. Ultrasound was used to identify targeted nerve, plexus, vascular marker, or fascial plane and place a needle adjacent to it in real-time.       2. Ultrasound was used to visualize the spread of anesthetic in close proximity to the above referenced structure.    Assessment/Narrative         The placement was negative for: blood aspirated, painful injection and site bleeding       Paresthesias: No.       Bolus given via needle..        Secured via.        Insertion/Infusion Method: Single Shot    Medication(s) Administered   Bupivacaine 0.5% w/ 1:200K Epi (Other) - Other   20 mL - 3/18/2024 9:39:00 AM  Medication Administration Time: 3/18/2024 9:39 AM     Comments:  The surgeon has given a verbal order transferring care of this patient to me for the performance of a regional analgesia block for post-op pain control. It is requested of me because I am uniquely trained and qualified to perform this block and the surgeon is neither trained nor qualified to perform this  "procedure.    Risks/benefits/alternatives of ultrasound guided peripheral nerve block(s) discussed. The patient was informed that undergoing the block is not required for surgery to proceed and is optional, but they can be beneficial in reducing post operative pain  medication requirements for a period of time (several hours to a few days). Block rationale, procedure, expected results, and block specific side effects reviewed with the patient. Risks, including but not limited to bleeding, infection, nerve damage/damage to surrounding structures, medication adverse/allergic reactions, and block failure discussed.  Questions encouraged and answered.  The patient wishes to proceed.        Peripheral nerve block was performed under direct US guidance. Incremental injection after negative aspiration. No paresthesias, no complications. Patient tolerated the procedure well.      FOR [facility] ([facility]) ONLY:   Pain Team Contact information: please page the Pain Team Via [employer]. Search \"Pain\". During daytime hours, please page the attending first. At night please page the resident first.      "

## 2024-03-18 NOTE — CONSULTS
Hospitalist consult received.  Full chart review completed. Lo Stewart is a 64 y/o Arabic speaking female with PMH significant for OA of bilateral knees, hypertension, hyperlipidemia, prediabetes, depression, and vitamin D deficiency who is s/p right TKA. VSS postoperatively. Essential home medications resumed. Will not formally see patient at this time since there are no active medical issues needing ongoing management while admitted. RN to page if any concerns arise and patient can be seen at that time.     Shelby Joe PA-C

## 2024-03-18 NOTE — OP NOTE
Lo Stewart  March 18, 2024  [facility]  PREOPERATIVE DIAGNOSIS: Severe degenerative arthritis refractory to conservative treatment, right knee.  POSTOPERATIVE DIAGNOSIS: Severe degenerative arthritis refractory to conservative treatment, right knee.  PROCEDURE: right total knee arthroplasty using Andrew persona total knee with a size 6 femoral component, a E  tibial baseplate, a 13 mm MC tibial insert and a 32 symmetric round patellar button.  SURGEON: Andrés Meredith MD.  FIRST ASSISTANT: Yolanda Diaz PA-C, who provided retraction, positioning and was crucial throughout the entire case.  ANESTHESIA: Femoral nerve block followed by GETA anesthetic.  ESTIMATED BLOOD LOSS: 10 cc.  Implants:   Implant Name Type Inv. Item Serial No.  Lot No. LRB No. Used Action   BONE CEMENT STRK SIMPLEX P SPEEDSET 6192-1-001 - [id number] Cement, Bone BONE CEMENT STRK SIMPLEX P SPEEDSET 6192-1-001  [employer] [id number] Right 1 Implanted   KNEE FEMUR CR CEMENT CCR STD SZ 6 R - [id number] Total Joint Component/Insert KNEE FEMUR CR CEMENT CCR STD SZ 6 R  [employer] [id number] Right 1 Implanted   IMP PATELLA ZIM KNEE ALL BEAR 32MM -333-32 - [id number] Total Joint Component/Insert IMP PATELLA ZIM KNEE ALL BEAR 32MM -194-32  [employer] [id number] Right 1 Implanted   IMP TIBIAL ZIM PSN NP STM 5DEG  ER -347-02 - [id number] Total Joint Component/Insert IMP TIBIAL ZIM PSN NP STM 5DEG  ER -048-02  [employer] [id number] Right 1 Implanted   SURFACE ARTC 13MM PERSONA MDL CNGR 6-7 E-F KN RT TIB - [id number] Total Joint Component/Insert SURFACE ARTC 13MM PERSONA MDL CNGR 6-7 E-F KN RT TIB  [employer] [id number] Right 1 Implanted     PREOPERATIVE ANTIBIOTIC PROPHYLAXIS: 2 gram IV ancef  With 1.95 gram of ORAL TXA PRIOR TO THE start.  POSTOPERATIVE DVT PROPHYLAXIS:  mg po BID.  INDICATION: Lo Stewart has severe degenerative arthritis in knee. They have  failed a course of conservative therapy, including cortisone, activity modification, physical therapy and pain medicine. Having discussed continued nonoperative versus operative treatment, the patient wished to proceed with surgery to include a total knee replacement as arthritis has affected all 3 compartments in the knee. The surgery, potential risks, benefits and complications as well as expected postoperative course and recovery were all discussed in detail. I reviewed the option of continued nonoperative treatment. All questions were answered, and informed consent was obtained.  OPERATIVE REPORT: The patient was taken to the preoperative area, where an adductor canal block was placed in the right lower extremity by the anesthesiologist. They were then taken to the operating room, and a general anesthetic was obtained. Intravenous antibiotic prophylaxis was then given as listed above 30 minutes prior to inflating the tourniquet. The right knee was confirmed as the operative knee. It was prepped and draped in the usual fashion. Timeout was performed, confirming the right knee as the operative knee, and then it was elevated and exsanguinated. Tourniquet was itifiated to 300 mmHg.  An approximately 5-inch long vertical incision was made over the anterior aspect of the knee centered over the patella. Full-thickness skin flaps were created. A medial parapatellar arthrotomy was performed. I performed a mild medial release on the upper aspect of the tibia. The patella was everted, and the patellar fat pad was excised.  I everted the patella laterally, flexed the knee up, removed the osteophytes in the distal femur, placed the intramedullary cutting guide and resected 10 mm off the distal femur in 5 degrees.  I then sized the distal femur.  The femur sized to a 6 judy persona femoral component.  With the cufting block in place, the anterior, posterior and chamfer cuts were performed.   I then paid my attention to the  tibia.  I resected the tibia perpendicular to the long axis of the tibia using and extramedullary guide.  Once this was done, I then removed the ACL, PCL, menisci and any posterior osteophytes off the condyles. I checked my flexion and extension gaps with the spacer block and the knee was well balanced. I then placed my trial implants into position.  With a size E tibia, a size 6 femur and a 13 mm thick MC polyethylene insert, this gave of good stability, full range of motion and the patella tracked nicely.  I then turned my attention to the patella, which measured 24 mm prior to resection.  I resected this to 13 mm and place a 32 mm in diameter 8.5mm thick round tripeg patella button in position.  With patella a button in position, this measured 24 mm and tracked nicely.    At this point, I marked my tibial rotation and punched for the tibial keel.  I then irrigated the knee with pulsed saline and prepared to cement in our final implants.  Using one batch of simplex speedset cement we cemented in a size 6 femur, a size E tibia, and 32 mm round tripeg patella button.  Once the cement had cured,  any excess cement was removed. I then trialed our polyethylene inserts and found that the 13 mm MC polyethylene insert gave us the best stability and full range of motion.   The real 13 mm MC insert was then impacted.  The knee tracked perfectly and was well balanced.  I PLACED ONE GRAM OF VANCOMYCIN POWDER IN THE WOUND.  The arthrotomy was then closed using interrupted 0 Vicryl plus sutures, 1 Stratafix plus suture and the skin was closed with absorbable sutures and staples in the skin.  An aquacel dressing was placed over the wound. The knee was placed in a soft compressive dressing. They were awoken and transferred to the Recovery Room in stable condition.  The postoperative plan will be to admit the patient to the hospital overnight. They can weightbear as tolerated. There were no noted complications. Sponge and needle  counts were correct.

## 2024-03-19 ENCOUNTER — APPOINTMENT (OUTPATIENT)
Dept: PHYSICAL THERAPY | Facility: CLINIC | Age: 64
End: 2024-03-19
Attending: ORTHOPAEDIC SURGERY
Payer: COMMERCIAL

## 2024-03-19 ENCOUNTER — APPOINTMENT (OUTPATIENT)
Dept: OCCUPATIONAL THERAPY | Facility: CLINIC | Age: 64
End: 2024-03-19
Attending: ORTHOPAEDIC SURGERY
Payer: COMMERCIAL

## 2024-03-19 VITALS
HEART RATE: 69 BPM | DIASTOLIC BLOOD PRESSURE: 47 MMHG | OXYGEN SATURATION: 92 % | TEMPERATURE: 98.2 F | RESPIRATION RATE: 18 BRPM | SYSTOLIC BLOOD PRESSURE: 104 MMHG | WEIGHT: 222.4 LBS | HEIGHT: 64 IN | BODY MASS INDEX: 37.97 KG/M2

## 2024-03-19 LAB
FASTING STATUS PATIENT QL REPORTED: ABNORMAL
GLUCOSE SERPL-MCNC: 149 MG/DL (ref 70–99)
HGB BLD-MCNC: 9.6 G/DL (ref 11.7–15.7)

## 2024-03-19 PROCEDURE — 97165 OT EVAL LOW COMPLEX 30 MIN: CPT | Mod: GO | Performed by: REHABILITATION PRACTITIONER

## 2024-03-19 PROCEDURE — 36415 COLL VENOUS BLD VENIPUNCTURE: CPT | Performed by: ORTHOPAEDIC SURGERY

## 2024-03-19 PROCEDURE — 97110 THERAPEUTIC EXERCISES: CPT | Mod: GP | Performed by: PHYSICAL THERAPIST

## 2024-03-19 PROCEDURE — 97535 SELF CARE MNGMENT TRAINING: CPT | Mod: GO | Performed by: REHABILITATION PRACTITIONER

## 2024-03-19 PROCEDURE — 97530 THERAPEUTIC ACTIVITIES: CPT | Mod: GP | Performed by: PHYSICAL THERAPIST

## 2024-03-19 PROCEDURE — 250N000013 HC RX MED GY IP 250 OP 250 PS 637: Performed by: PHYSICIAN ASSISTANT

## 2024-03-19 PROCEDURE — 82947 ASSAY GLUCOSE BLOOD QUANT: CPT | Performed by: ORTHOPAEDIC SURGERY

## 2024-03-19 PROCEDURE — 97116 GAIT TRAINING THERAPY: CPT | Mod: GP | Performed by: PHYSICAL THERAPIST

## 2024-03-19 PROCEDURE — 85018 HEMOGLOBIN: CPT | Performed by: ORTHOPAEDIC SURGERY

## 2024-03-19 PROCEDURE — 250N000011 HC RX IP 250 OP 636: Performed by: ORTHOPAEDIC SURGERY

## 2024-03-19 PROCEDURE — 250N000013 HC RX MED GY IP 250 OP 250 PS 637: Performed by: ORTHOPAEDIC SURGERY

## 2024-03-19 PROCEDURE — 96372 THER/PROPH/DIAG INJ SC/IM: CPT | Performed by: ORTHOPAEDIC SURGERY

## 2024-03-19 RX ORDER — LISINOPRIL 20 MG/1
20 TABLET ORAL DAILY
COMMUNITY

## 2024-03-19 RX ORDER — OXYCODONE HCL 10 MG/1
10 TABLET, FILM COATED, EXTENDED RELEASE ORAL EVERY 12 HOURS
Status: COMPLETED | OUTPATIENT
Start: 2024-03-19 | End: 2024-03-19

## 2024-03-19 RX ADMIN — OXYCODONE HYDROCHLORIDE 10 MG: 10 TABLET ORAL at 06:24

## 2024-03-19 RX ADMIN — OXYCODONE HYDROCHLORIDE 10 MG: 10 TABLET, FILM COATED, EXTENDED RELEASE ORAL at 08:21

## 2024-03-19 RX ADMIN — AMLODIPINE BESYLATE 10 MG: 10 TABLET ORAL at 08:21

## 2024-03-19 RX ADMIN — HYDROMORPHONE HYDROCHLORIDE 0.4 MG: 0.2 INJECTION, SOLUTION INTRAMUSCULAR; INTRAVENOUS; SUBCUTANEOUS at 01:25

## 2024-03-19 RX ADMIN — CITALOPRAM HYDROBROMIDE 40 MG: 20 TABLET ORAL at 08:22

## 2024-03-19 RX ADMIN — ACETAMINOPHEN 975 MG: 325 TABLET, FILM COATED ORAL at 06:06

## 2024-03-19 RX ADMIN — FAMOTIDINE 20 MG: 20 TABLET ORAL at 08:21

## 2024-03-19 RX ADMIN — ENOXAPARIN SODIUM 40 MG: 40 INJECTION SUBCUTANEOUS at 08:21

## 2024-03-19 RX ADMIN — OXYCODONE HYDROCHLORIDE 10 MG: 10 TABLET ORAL at 15:20

## 2024-03-19 RX ADMIN — CEFAZOLIN SODIUM 2 G: 2 INJECTION, SOLUTION INTRAVENOUS at 01:17

## 2024-03-19 RX ADMIN — ACETAMINOPHEN 975 MG: 325 TABLET, FILM COATED ORAL at 15:20

## 2024-03-19 RX ADMIN — LISINOPRIL 10 MG: 10 TABLET ORAL at 08:23

## 2024-03-19 RX ADMIN — SENNOSIDES AND DOCUSATE SODIUM 1 TABLET: 8.6; 5 TABLET ORAL at 08:21

## 2024-03-19 RX ADMIN — POLYETHYLENE GLYCOL 3350 17 G: 17 POWDER, FOR SOLUTION ORAL at 08:21

## 2024-03-19 RX ADMIN — HYDROMORPHONE HYDROCHLORIDE 0.2 MG: 0.2 INJECTION, SOLUTION INTRAMUSCULAR; INTRAVENOUS; SUBCUTANEOUS at 11:05

## 2024-03-19 RX ADMIN — OXYCODONE HYDROCHLORIDE 10 MG: 10 TABLET ORAL at 10:12

## 2024-03-19 ASSESSMENT — ACTIVITIES OF DAILY LIVING (ADL)
ADLS_ACUITY_SCORE: 32
ADLS_ACUITY_SCORE: 31
ADLS_ACUITY_SCORE: 31
ADLS_ACUITY_SCORE: 32
ADLS_ACUITY_SCORE: 31
ADLS_ACUITY_SCORE: 32
ADLS_ACUITY_SCORE: 31
ADLS_ACUITY_SCORE: 32
IADL_COMMENTS: FAMILY TO COMPLETE AS NEEDED
ADLS_ACUITY_SCORE: 31
ADLS_ACUITY_SCORE: 31

## 2024-03-19 NOTE — PROGRESS NOTES
Patient vital signs are at baseline: Yes  Patient able to ambulate as they were prior to admission or with assist devices provided by therapies during their stay:  No,  Reason:  Pt heavy assist of 1-2 with walker, gait belt.  Patient MUST void prior to discharge:  Yes  Patient able to tolerate oral intake:  Yes  Pain has adequate pain control using Oral analgesics:  No,  Reason:  Pt required IV dilaudid x 2 w/ oral oxycodone.  Does patient have an identified :  Yes  Has goal D/C date and time been discussed with patient:  Yes    VSS, pt on room air til HS pt placed on 2L NC. Capno WNL, discontinued til HS and pt placed back on for sleeping r/t increased pain medication. Pt alert and oriented x 4. Pt got OOB x 2. First time OOB w/ therapy at 1700 w/ walker, gait belt to bathroom. Pt up again at 2125 to bathroom assist of 2 with walker, gait belt. Pt received PRN IV dilaudid x 2 after each walking occurrence r/t breakthrough pain leaving pt in tears. Pt received PRN oxycodone 10 mg x 2 which was somewhat effective despite the breakthrough pain after ambulating. PIV running LR at 100 ml/hr. Pt voiding adequately w/ PVR x 2 <100 ml. IS in use. CMS intact. Daughter at bedside. Tolerating diet, fair appetite. Discharge pending.

## 2024-03-19 NOTE — PROGRESS NOTES
"Orthopedic Surgery  3/19/2024    S: Patient with pain overnight.     O: Blood pressure 136/81, pulse 85, temperature 98.6  F (37  C), temperature source Oral, resp. rate 18, height 1.626 m (5' 4\"), weight 100.9 kg (222 lb 6.4 oz), SpO2 97%, not currently breastfeeding.  Lab Results   Component Value Date    HGB 14.3 11/24/2021    HGB 13.5 12/15/2020     Lab Results   Component Value Date    INR 1.03 12/26/2016        Neurovascularly intact.  Calves are negative bilaterally, both soft and nontender.  The wound is C/D/I.  The wound looks good with minimal erythema of the surrounding skin.    A: Ms. Willy Stewart is doing well status post Procedure(s):  Right total knee arthroplasty.    P: Continue physical therapy.   Anticoagulation with lovenox, home on ASA  Pain management, will work on pain mgmt today  Discharge planning, likely home today    Kevin Meredith MD  [phone]    "

## 2024-03-19 NOTE — PHARMACY-ADMISSION MEDICATION HISTORY
Pharmacist Admission Medication History    Admission medication history is complete. The information provided in this note is only as accurate as the sources available at the time of the update.    Information Source(s): Patient, Family member, and CareEverywhere/SureScripts via in-person    Pertinent Information:      Changes made to PTA medication list:  Added: None  Deleted: Amlodipine  Changed: Lisinopril 10mg-->20mg daily    Allergies reviewed with patient and updates made in EHR: yes    Medication History Completed By: Lizeth Wilks PharmD 3/19/2024 9:16 AM        The following list has discharge meds & PTA med list combined.    PTA Med List   Medication Sig Last Dose    acetaminophen (TYLENOL) 325 MG tablet Take 2 tablets (650 mg) by mouth every 4 hours as needed for other (mild pain)     acetaminophen (TYLENOL) 325 MG tablet Take 2 tablets (650 mg) by mouth every 6 hours as needed for mild pain prn    Ascorbic Acid (VITAMIN C) 500 MG CAPS Take 1 capsule by mouth daily Past Week    aspirin (ASA) 325 MG EC tablet Take 1 tablet (325 mg) by mouth 2 times daily     atorvastatin (LIPITOR) 40 MG tablet Take 40 mg by mouth daily 3/17/2024    citalopram (CELEXA) 40 MG tablet Take 40 mg by mouth daily 3/17/2024    CRANBERRY PO Take 1 tablet by mouth daily Past Week    hydrOXYzine HCl (ATARAX) 25 MG tablet Take 1 tablet (25 mg) by mouth every 6 hours as needed for itching or anxiety (with pain, moderate pain)     ibuprofen (ADVIL/MOTRIN) 600 MG tablet Take 1 tablet (600 mg) by mouth every 6 hours as needed for mild pain     lisinopril (ZESTRIL) 20 MG tablet Take 20 mg by mouth daily 3/16/2024    oxyCODONE (ROXICODONE) 5 MG tablet Take 1-2 tablets (5-10 mg) by mouth every 4 hours as needed for moderate to severe pain     senna-docusate (SENOKOT-S/PERICOLACE) 8.6-50 MG tablet Take 1-2 tablets by mouth 2 times daily Take while on oral narcotics to prevent or treat constipation.     Vitamin D3 (CHOLECALCIFEROL) 25 mcg  (1000 units) tablet Take 2,000 Units by mouth daily Past Week

## 2024-03-19 NOTE — PLAN OF CARE
"/91   Pulse 85   Temp 97.7  F  Resp 18  SpO2 95%      Patient vital signs are at baseline: Yes  Patient able to ambulate as they were prior to admission or with assist devices provided by therapies during their stay:  No,  Reason:  Still in a lot of pain, assist of 1-2 with walker and gait belt.   Patient MUST void prior to discharge:  Yes  Patient able to tolerate oral intake:  Yes  Pain has adequate pain control using Oral analgesics:  Yes, pain is much better than when first came up to the floor.   Does patient have an identified :  Yes, daughter  Has goal D/C date and time been discussed with patient:  Yes    Patient is alert and oriented x4, pain is much more tolerable. IV dilaudid was given earlier and oral oxycodone in the morning. Got up to go to the bathroom with 1 assist with gait belt and walker. Lots of pain and crying but managed to walk there and back and did well. Tolerating oral intake and meds by mouth well.   Problem: Adult Inpatient Plan of Care  Goal: Plan of Care Review  Description: The Plan of Care Review/Shift note should be completed every shift.  The Outcome Evaluation is a brief statement about your assessment that the patient is improving, declining, or no change.  This information will be displayed automatically on your shift  note.  Outcome: Progressing  Goal: Patient-Specific Goal (Individualized)  Description: You can add care plan individualizations to a care plan. Examples of Individualization might be:  \"Parent requests to be called daily at 9am for status\", \"I have a hard time hearing out of my right ear\", or \"Do not touch me to wake me up as it startles  me\".  Outcome: Progressing  Goal: Absence of Hospital-Acquired Illness or Injury  Outcome: Progressing  Intervention: Identify and Manage Fall Risk  Recent Flowsheet Documentation  Taken 3/18/2024 1535 by Kylee Tejada, RN  Safety Promotion/Fall Prevention:   activity supervised   clutter free environment " maintained   mobility aid in reach   nonskid shoes/slippers when out of bed   patient and family education   safety round/check completed   supervised activity   room organization consistent   room near nurse's station  Intervention: Prevent Skin Injury  Recent Flowsheet Documentation  Taken 3/18/2024 2344 by Kylee Tejada RN  Body Position: weight shifting  Intervention: Prevent and Manage VTE (Venous Thromboembolism) Risk  Recent Flowsheet Documentation  Taken 3/18/2024 2344 by Kylee Tejada RN  VTE Prevention/Management: SCDs (sequential compression devices) on  Intervention: Prevent Infection  Recent Flowsheet Documentation  Taken 3/18/2024 2344 by Kylee Tejada RN  Infection Prevention:   hand hygiene promoted   rest/sleep promoted   single patient room provided  Goal: Optimal Comfort and Wellbeing  Outcome: Progressing  Intervention: Monitor Pain and Promote Comfort  Recent Flowsheet Documentation  Taken 3/19/2024 0125 by Kylee Tejada RN  Pain Management Interventions: medication (see MAR)  Taken 3/18/2024 2344 by Kylee Tejada RN  Pain Management Interventions: cold applied  Goal: Readiness for Transition of Care  Outcome: Progressing     Problem: Knee Arthroplasty  Goal: Optimal Coping  Outcome: Progressing  Goal: Absence of Bleeding  Outcome: Progressing  Goal: Effective Bowel Elimination  Outcome: Progressing  Goal: Fluid and Electrolyte Balance  Outcome: Progressing  Goal: Optimal Functional Ability  Outcome: Progressing  Intervention: Promote Optimal Functional Status  Recent Flowsheet Documentation  Taken 3/18/2024 2344 by Kylee Tejada RN  Assistive Device Utilized:   gait belt   walker  Activity Management:   activity adjusted per tolerance   ambulated in room   ambulated to bathroom  Goal: Absence of Infection Signs and Symptoms  Outcome: Progressing  Intervention: Prevent or Manage Infection  Recent Flowsheet Documentation  Taken 3/18/2024 2344 by Kylee Tejada RN  Isolation Precautions:  contact precautions maintained  Goal: Intact Neurovascular Status  Outcome: Progressing  Goal: Anesthesia/Sedation Recovery  Outcome: Progressing  Intervention: Optimize Anesthesia Recovery  Recent Flowsheet Documentation  Taken 3/18/2024 2344 by Kylee Tejada, RN  Safety Promotion/Fall Prevention:   activity supervised   clutter free environment maintained   mobility aid in reach   nonskid shoes/slippers when out of bed   patient and family education   safety round/check completed   supervised activity   room organization consistent   room near nurse's station  Goal: Optimal Pain Control and Function  Outcome: Progressing  Intervention: Prevent or Manage Pain  Recent Flowsheet Documentation  Taken 3/19/2024 0125 by Kylee Tejada, RN  Pain Management Interventions: medication (see MAR)  Taken 3/18/2024 2344 by Kylee Tejada, RN  Pain Management Interventions: cold applied  Goal: Nausea and Vomiting Relief  Outcome: Progressing  Goal: Effective Urinary Elimination  Outcome: Progressing  Goal: Effective Oxygenation and Ventilation  Outcome: Progressing  Intervention: Optimize Oxygenation and Ventilation  Recent Flowsheet Documentation  Taken 3/18/2024 2344 by Kylee Tejada, RN  Head of Bed (HOB) Positioning: HOB at 30-45 degrees

## 2024-03-19 NOTE — PROGRESS NOTES
[facility]  OUTPATIENT OCCUPATIONAL THERAPY  EVALUATION  PLAN OF TREATMENT FOR OUTPATIENT REHABILITATION  (COMPLETE FOR INITIAL CLAIMS ONLY)  Patient's Last Name, First Name, M.I.  YOB: 1960  Willy TerryLo                             Provider's Name  [facility] Medical Record No.  [id number]                             Onset Date:  03/18/24   Start of Care Date:  03/19/24   Type:     ___PT   _X_OT   ___SLP Medical Diagnosis:  R TKA                    OT Diagnosis:  decreased ADL/IADLs Visits from SOC:  1     See note for plan of treatment, functional goals and certification details    I CERTIFY THE NEED FOR THESE SERVICES FURNISHED UNDER        THIS PLAN OF TREATMENT AND WHILE UNDER MY CARE     (Physician co-signature of this document indicates review and certification of the therapy plan).                          03/19/24 0930   Appointment Info   Signing Clinician's Name / Credentials (OT) Carly Stockton OTR/L   Quick Adds   Quick Adds Certification       Present yes  (daughter interpreting, waiver signed per nurse)   Language Romanian   Living Environment   People in Home child(janina), adult;grandchild(janina)   Current Living Arrangements house   Home Accessibility stairs to enter home;stairs within home   Number of Stairs, Within Home, Primary greater than 10 stairs   Transportation Anticipated family or friend will provide   Living Environment Comments Pt lives in house with adult daughter and young grandchildren, 2STE. Pt will be staying on main level upon discharge. Adult daughter supportive and will be assisting at discharge.   Self-Care   Usual Activity Tolerance good   Equipment Currently Used at Home cane, straight   Fall history within last six months no   Activity/Exercise/Self-Care Comment Pt IND at baseline for mobility and cares. Daughter reports she may have a walker at home to use   Instrumental  Activities of Daily Living (IADL)   IADL Comments family to complete as needed   General Information   Onset of Illness/Injury or Date of Surgery 03/18/24   Referring Physician Dr. Meredith   Patient/Family Therapy Goal Statement (OT) to dc home with family support   Additional Occupational Profile Info/Pertinent History of Current Problem Patient is POD #1 for R TKA   Existing Precautions/Restrictions fall   Right Lower Extremity (Weight-bearing Status) weight-bearing as tolerated (WBAT)   Cognitive Status Examination   Orientation Status orientation to person, place and time   Visual Perception   Visual Impairment/Limitations WFL   Sensory   Sensory Quick Adds sensation intact   Pain Assessment   Patient Currently in Pain Yes, see Vital Sign flowsheet  (rating pain 8/10 at rest, 10/10 with activity,RN notified and dicussed pain management medication plan with pt)   Posture   Posture not impaired   Range of Motion Comprehensive   General Range of Motion bilateral upper extremity ROM WNL   Strength Comprehensive (MMT)   Comment, General Manual Muscle Testing (MMT) Assessment decreased strength in R LE to be expected   Muscle Tone Assessment   Muscle Tone Quick Adds No deficits were identified   Coordination   Upper Extremity Coordination No deficits were identified   Bed Mobility   Comment (Bed Mobility) mod A with bed mobility to support R LE in/out of bed, famiy will A as needed, per dtr report   Transfers   Transfers sit-stand transfer;toilet transfer;shower transfer   Sit-Stand Transfer   Sit-Stand Celestine (Transfers) contact guard;verbal cues   Assistive Device (Sit-Stand Transfers) walker, front-wheeled   Shower Transfer   Shower Transfer Comments max A with tub transfeer   Toilet Transfer   Toilet Transfer Comments pt declined offer to practice, will nohemi macias, educated pt and dtr in how to adjust for comfort and optimal leverage to stand   Balance   Balance Comments LOB was not noted, general  unsteadiness to be expected   Activities of Daily Living   BADL Assessment/Intervention lower body dressing   Lower Body Dressing Assessment/Training   Marshall Level (Lower Body Dressing) minimum assist (75% patient effort);verbal cues   Clinical Impression   Criteria for Skilled Therapeutic Interventions Met (OT) Yes, treatment indicated   OT Diagnosis decreased ADL/IADLs   OT Problem List-Impairments impacting ADL activity tolerance impaired;balance;pain;strength;range of motion (ROM)   Assessment of Occupational Performance 5 or more Performance Deficits   Identified Performance Deficits Dsg, toileting, bathing, functional/community mobility, household chores, driving, errands   Planned Therapy Interventions (OT) ADL retraining;progressive activity/exercise;transfer training   Clinical Decision Making Complexity (OT) problem focused assessment/low complexity   Risk & Benefits of therapy have been explained evaluation/treatment results reviewed;care plan/treatment goals reviewed;risks/benefits reviewed;current/potential barriers reviewed;participants voiced agreement with care plan;patient;daughter   OT Total Evaluation Time   OT Eval, Low Complexity Minutes (29587) 10   Therapy Certification   Medical Diagnosis R TKA   Start of Care Date 03/19/24   Certification date from 03/19/24   Certification date to 03/19/24   OT Goals   Therapy Frequency (OT) One time eval and treatment   OT Predicted Duration/Target Date for Goal Attainment 03/19/24   OT Goals Lower Body Dressing;Toilet Transfer/Toileting;Transfers;OT Goal 1;OT Goal 2   OT: Lower Body Dressing Supervision/stand-by assist;Goal Met   OT: Transfer Moderate assist;with assistive device;Goal Met  (tub transfer)   OT: Goal 1 Patient will verbalize at least 2-3 adaptations to ADLs routines at home to accommodate energy level and safety needs.- goal met   OT: Goal 2 Patient will demonstrate safe use of walker during ADLs.- goal met   Self-Care/Home Management    Self-Care/Home Mgmt/ADL, Compensatory, Meal Prep Minutes (30310) 40   Symptoms Noted During/After Treatment (Meal Preparation/Planning Training) increased pain;fatigue   Treatment Detail/Skilled Intervention Educated in ADL compensatory techniques, general walker safety, EC/WS techniques, advancement of activity following surgery, car transfers, driving readiness and ADL AE recommendations; extended tub bench, LHS, and how to obtain in community. Throughout session, patient was engaged in instruction and verbalized understanding. After instruction, patient was able to demonstrate understanding and carry over by usign techniques to complete LE dressing with SBA, CGA, fww sit<>stand, progressed to SBA, fww to fully don clothes over hips. Ambulated ~15' to  with CGA, fww, was transported to [facility] for bathroom trasnfer training. Educated in tub bench options, afterwards, pt opted for extended tub bench. CGA, fww sit<>stand, completed transfer in/out of tub with min- mod A to lift R LE in/out of tub. Educated in toilet options, dtr reports they will borrow a commode, educated in how to set appropriate height for pt. Pt returned to  for transport back to room. Upon returnt o room, pt was SBA, fww sit<>stand, ambulated back to bed. mod A to support R LE to return to supine. All questions answered, goals met.   OT Discharge Planning   OT Plan dc   OT Rationale for DC Rec defer to ortho team for dc plan- patient has met needed goals for safe discharge home with family to A as needed with IADL's; household chores, driving, errands, cooking and bathing. Recommended AE; extended tub bench and LHS, plans to borrow commode patient has all other needed AE. Will discharge from OT services.   OT Brief overview of current status mod A bed mobility, CGA/SBA, fww sit<>stand and limited functional mobility d/t pain, will have A with ADLs as needed from dtr.   Total Session Time   Timed Code Treatment Minutes 40   Total Session  Time (sum of timed and untimed services) 50

## 2024-03-20 NOTE — PLAN OF CARE
Physical Therapy Discharge Summary    Reason for therapy discharge:    Discharged to home.    Progress towards therapy goal(s). See goals on Care Plan in [facility] electronic health record for goal details.  Goals not met.  Barriers to achieving goals:   discharge from facility. Pt demonstrated performance to return home with excellent support from family.    Therapy recommendation(s):    Defer to ortho

## 2024-11-21 ENCOUNTER — OFFICE VISIT (OUTPATIENT)
Dept: INTERNAL MEDICINE | Facility: CLINIC | Age: 64
End: 2024-11-21
Payer: MEDICAID

## 2024-11-21 VITALS
BODY MASS INDEX: 37.81 KG/M2 | HEIGHT: 64 IN | TEMPERATURE: 98.4 F | DIASTOLIC BLOOD PRESSURE: 75 MMHG | SYSTOLIC BLOOD PRESSURE: 133 MMHG | WEIGHT: 221.5 LBS | OXYGEN SATURATION: 96 % | RESPIRATION RATE: 18 BRPM | HEART RATE: 91 BPM

## 2024-11-21 DIAGNOSIS — E66.01 CLASS 2 SEVERE OBESITY DUE TO EXCESS CALORIES WITH SERIOUS COMORBIDITY AND BODY MASS INDEX (BMI) OF 38.0 TO 38.9 IN ADULT (H): ICD-10-CM

## 2024-11-21 DIAGNOSIS — R10.11 RUQ ABDOMINAL PAIN: Primary | ICD-10-CM

## 2024-11-21 DIAGNOSIS — E66.812 CLASS 2 SEVERE OBESITY DUE TO EXCESS CALORIES WITH SERIOUS COMORBIDITY AND BODY MASS INDEX (BMI) OF 38.0 TO 38.9 IN ADULT (H): ICD-10-CM

## 2024-11-21 DIAGNOSIS — Z96.651 S/P TOTAL KNEE ARTHROPLASTY, RIGHT: ICD-10-CM

## 2024-11-21 LAB
BASOPHILS # BLD AUTO: 0 10E3/UL (ref 0–0.2)
BASOPHILS NFR BLD AUTO: 0 %
EOSINOPHIL # BLD AUTO: 0.3 10E3/UL (ref 0–0.7)
EOSINOPHIL NFR BLD AUTO: 3 %
ERYTHROCYTE [DISTWIDTH] IN BLOOD BY AUTOMATED COUNT: 14.3 % (ref 10–15)
HCT VFR BLD AUTO: 38.8 % (ref 35–47)
HGB BLD-MCNC: 12.5 G/DL (ref 11.7–15.7)
IMM GRANULOCYTES # BLD: 0 10E3/UL
IMM GRANULOCYTES NFR BLD: 0 %
LYMPHOCYTES # BLD AUTO: 2.6 10E3/UL (ref 0.8–5.3)
LYMPHOCYTES NFR BLD AUTO: 34 %
MCH RBC QN AUTO: 28.2 PG (ref 26.5–33)
MCHC RBC AUTO-ENTMCNC: 32.2 G/DL (ref 31.5–36.5)
MCV RBC AUTO: 87 FL (ref 78–100)
MONOCYTES # BLD AUTO: 0.9 10E3/UL (ref 0–1.3)
MONOCYTES NFR BLD AUTO: 11 %
NEUTROPHILS # BLD AUTO: 4 10E3/UL (ref 1.6–8.3)
NEUTROPHILS NFR BLD AUTO: 51 %
PLATELET # BLD AUTO: 312 10E3/UL (ref 150–450)
RBC # BLD AUTO: 4.44 10E6/UL (ref 3.8–5.2)
WBC # BLD AUTO: 7.8 10E3/UL (ref 4–11)

## 2024-11-21 RX ORDER — TRAMADOL HYDROCHLORIDE 50 MG/1
50 TABLET ORAL EVERY 6 HOURS PRN
COMMUNITY

## 2024-11-21 ASSESSMENT — PATIENT HEALTH QUESTIONNAIRE - PHQ9
10. IF YOU CHECKED OFF ANY PROBLEMS, HOW DIFFICULT HAVE THESE PROBLEMS MADE IT FOR YOU TO DO YOUR WORK, TAKE CARE OF THINGS AT HOME, OR GET ALONG WITH OTHER PEOPLE: VERY DIFFICULT
SUM OF ALL RESPONSES TO PHQ QUESTIONS 1-9: 14
SUM OF ALL RESPONSES TO PHQ QUESTIONS 1-9: 14

## 2024-11-21 NOTE — PROGRESS NOTES
"  Assessment & Plan     RUQ abdominal pain  Discussed   - CBC with platelets and differential; Future  - US Abdomen Limited; Future  - Comprehensive metabolic panel (BMP + Alb, Alk Phos, ALT, AST, Total. Bili, TP); Future    Class 2 severe obesity due to excess calories with serious comorbidity and body mass index (BMI) of 38.0 to 38.9 in adult (H)  Addressed     S/p knee   Incision looks well healed - using cane for mobility       BMI  Estimated body mass index is 38.02 kg/m  as calculated from the following:    Height as of this encounter: 1.626 m (5' 4\").    Weight as of this encounter: 100.5 kg (221 lb 8 oz).   Weight management plan: Discussed healthy diet and exercise guidelines    Depression Screening Follow Up        11/21/2024     4:26 PM   PHQ   PHQ-9 Total Score 14    Q9: Thoughts of better off dead/self-harm past 2 weeks More than half the days    F/U: Thoughts of suicide or self-harm No    F/U: Safety concerns No        Patient-reported         Patient Instructions   Lab in [address]    Ultrasound gallbladder they will call you to set up     Subjective   Lo is a 64 year old, presenting for the following health issues:  Pain (She has pain in her back and thigh. She was seen and given medications. She is here for a follow up. The pain in her left is gone but now the right leg started to hurt. ) and Abdominal Pain      11/21/2024     4:36 PM   Additional Questions   Roomed by Lucia Arroyo MA   Accompanied by Her Grandson     Pain    History of Present Illness       Reason for visit:  Right side on the stomach  Symptom onset:  1-2 weeks ago She is missing 7 dose(s) of medications per week.      Right side abdominal pain on the side   Had red chili and pain worse - tried tramadol she had from previous back pain    Sparkling water and had diarrhea  at the time     Happened 2 days span - night and then am and afternoon   In [place] and took pills   11/15-16    Now not present but worried  it could be " "appendix or something wrong     Recent knee surgery - incision looks good    Review of Systems  Constitutional, neuro, ENT, endocrine, pulmonary, cardiac, gastrointestinal, genitourinary, musculoskeletal, integument and psychiatric systems are negative, except as otherwise noted.      Objective    /75 (BP Location: Left arm, Patient Position: Sitting, Cuff Size: Adult Large)   Pulse 91   Temp 98.4  F (36.9  C) (Oral)   Resp 18   Ht 1.626 m (5' 4\")   Wt 100.5 kg (221 lb 8 oz)   SpO2 96%   BMI 38.02 kg/m    Body mass index is 38.02 kg/m .  Physical Exam   GENERAL: alert and no distress-  on phone   NECK: no adenopathy, no asymmetry, masses, or scars  RESP: lungs clear to auscultation - no rales, rhonchi or wheezes  CV: regular rate and rhythm, normal S1 S2, no S3 or S4, no murmur, click or rub, no peripheral edema  ABDOMEN: mild RUQ pain under rib area   Neg meyers sign - this in area of pain previously felt   MS: no gross musculoskeletal defects noted, no edema  NEURO: Normal strength and tone, mentation intact and speech normal  PSYCH: mentation appears normal, affect normal/bright    Lab  US        Signed Electronically by: YIN Smith CNP    "

## (undated) DEVICE — SUTURE MONOCRYL+ 2-0 CT-1 36" UNDYED MCP945H

## (undated) DEVICE — GLOVE BIOGEL PI MICRO INDICATOR UNDERGLOVE SZ 7.0 48970

## (undated) DEVICE — GLOVE BIOGEL PI ULTRATOUCH SZ 8.0 41180

## (undated) DEVICE — GLOVE BIOGEL PI SZ 7.5 40875

## (undated) DEVICE — DRSG AQUACEL AG 3.5X14"  422607

## (undated) DEVICE — SU STRATAFIX PDS PLUS 1 CT-1 12" SXPP1A443

## (undated) DEVICE — TOURNIQUET SGL  BLADDER 30"X4" BLUE 5921030135

## (undated) DEVICE — SUCTION MANIFOLD NEPTUNE 2 SYS 4 PORT 0702-020-000

## (undated) DEVICE — GLOVE BIOGEL PI MICRO INDICATOR UNDERGLOVE SZ 8.0 48980

## (undated) DEVICE — LINEN DRAPE 54X72" 5467

## (undated) DEVICE — SU VICRYL+ 1 MO-4 18" DYED VCP702D

## (undated) DEVICE — GLOVE BIOGEL PI SZ 7.0 40870

## (undated) DEVICE — CAST PADDING 6" STERILE 9046S

## (undated) DEVICE — Device

## (undated) DEVICE — SET HANDPIECE INTERPULSE W/COAXIAL FAN SPRAY TIP 0210118000

## (undated) DEVICE — BLADE SAW SAGITTAL STRK 25X75X0.89MM SYS 6 6125-089-075

## (undated) DEVICE — PREP CHLORAPREP 26ML TINTED HI-LITE ORANGE 930815

## (undated) DEVICE — LINEN ORTHO ACL PACK 5447

## (undated) DEVICE — PACK TOTAL KNEE BOXED LATEX FREE PO15TKFCT

## (undated) DEVICE — HOOD FLYTE W/PEELAWAY 408-800-100

## (undated) DEVICE — BLADE SAW SAGITTAL STRK 18X90X1.27MM HD SYS 6 6118-127-090

## (undated) DEVICE — BAG CLEAR TRASH 1.3M 39X33" P4040C

## (undated) DEVICE — DRAPE STERI U 1015

## (undated) DEVICE — BONE CEMENT MIXEVAC III HI VAC KIT  0206-015-000

## (undated) DEVICE — SU STRATAFIX MONOCRYL 3-0 SPIRAL PS-1 45CM SXMP1B102

## (undated) DEVICE — SOL NACL 0.9% IRRIG 3000ML BAG 2B7477

## (undated) DEVICE — LINEN FULL SHEET 5511

## (undated) RX ORDER — HYDROMORPHONE HCL IN WATER/PF 6 MG/30 ML
PATIENT CONTROLLED ANALGESIA SYRINGE INTRAVENOUS
Status: DISPENSED
Start: 2024-03-18

## (undated) RX ORDER — CEFAZOLIN SODIUM/WATER 2 G/20 ML
SYRINGE (ML) INTRAVENOUS
Status: DISPENSED
Start: 2024-03-18

## (undated) RX ORDER — TRANEXAMIC ACID 10 MG/ML
INJECTION, SOLUTION INTRAVENOUS
Status: DISPENSED
Start: 2024-03-18

## (undated) RX ORDER — FENTANYL CITRATE 50 UG/ML
INJECTION, SOLUTION INTRAMUSCULAR; INTRAVENOUS
Status: DISPENSED
Start: 2024-03-18

## (undated) RX ORDER — CELECOXIB 200 MG/1
CAPSULE ORAL
Status: DISPENSED
Start: 2024-03-18

## (undated) RX ORDER — ACETAMINOPHEN 325 MG/1
TABLET ORAL
Status: DISPENSED
Start: 2024-03-18

## (undated) RX ORDER — OXYCODONE HYDROCHLORIDE 5 MG/1
TABLET ORAL
Status: DISPENSED
Start: 2024-03-18

## (undated) RX ORDER — PROPOFOL 10 MG/ML
INJECTION, EMULSION INTRAVENOUS
Status: DISPENSED
Start: 2024-03-18

## (undated) RX ORDER — VANCOMYCIN HYDROCHLORIDE 1 G/20ML
INJECTION, POWDER, LYOPHILIZED, FOR SOLUTION INTRAVENOUS
Status: DISPENSED
Start: 2024-03-18